# Patient Record
Sex: MALE | Race: WHITE | NOT HISPANIC OR LATINO | URBAN - METROPOLITAN AREA
[De-identification: names, ages, dates, MRNs, and addresses within clinical notes are randomized per-mention and may not be internally consistent; named-entity substitution may affect disease eponyms.]

---

## 2019-08-02 ENCOUNTER — HOSPITAL ENCOUNTER (OUTPATIENT)
Dept: GENERAL RADIOLOGY | Facility: HOSPITAL | Age: 39
Discharge: HOME | End: 2019-08-02
Attending: FAMILY MEDICINE

## 2020-03-20 ENCOUNTER — HOSPITAL ENCOUNTER (OUTPATIENT)
Dept: GENERAL RADIOLOGY | Facility: HOSPITAL | Age: 40
Discharge: HOME | End: 2020-03-20
Attending: FAMILY MEDICINE

## 2021-03-23 ENCOUNTER — HOSPITAL ENCOUNTER (OUTPATIENT)
Dept: LAB | Facility: HOSPITAL | Age: 41
Discharge: HOME | End: 2021-03-23
Attending: PHYSICIAN ASSISTANT

## 2021-03-23 ENCOUNTER — OFFICE VISIT CONVERTED (OUTPATIENT)
Dept: FAMILY MEDICINE CLINIC | Facility: CLINIC | Age: 41
End: 2021-03-23
Attending: PHYSICIAN ASSISTANT

## 2021-03-23 LAB
25(OH)D3 SERPL-MCNC: 39 NG/ML (ref 30–100)
ALBUMIN SERPL-MCNC: 4.9 G/DL (ref 3.5–5)
ALBUMIN/GLOB SERPL: 1.6 {RATIO} (ref 1.4–2.6)
ALP SERPL-CCNC: 88 U/L (ref 53–128)
ALT SERPL-CCNC: 83 U/L (ref 10–40)
ANION GAP SERPL CALC-SCNC: 18 MMOL/L (ref 8–19)
APPEARANCE UR: CLEAR
AST SERPL-CCNC: 47 U/L (ref 15–50)
BASOPHILS # BLD AUTO: 0.03 10*3/UL (ref 0–0.2)
BASOPHILS NFR BLD AUTO: 0.5 % (ref 0–3)
BILIRUB SERPL-MCNC: 0.88 MG/DL (ref 0.2–1.3)
BILIRUB UR QL: NEGATIVE
BUN SERPL-MCNC: 16 MG/DL (ref 5–25)
BUN/CREAT SERPL: 15 {RATIO} (ref 6–20)
CALCIUM SERPL-MCNC: 9.4 MG/DL (ref 8.7–10.4)
CHLORIDE SERPL-SCNC: 102 MMOL/L (ref 99–111)
CHOLEST SERPL-MCNC: 192 MG/DL (ref 107–200)
CHOLEST/HDLC SERPL: 3.3 {RATIO} (ref 3–6)
COLOR UR: YELLOW
CONV ABS IMM GRAN: 0.02 10*3/UL (ref 0–0.2)
CONV CO2: 24 MMOL/L (ref 22–32)
CONV COLLECTION SOURCE (UA): NORMAL
CONV IMMATURE GRAN: 0.4 % (ref 0–1.8)
CONV TOTAL PROTEIN: 7.9 G/DL (ref 6.3–8.2)
CONV UROBILINOGEN IN URINE BY AUTOMATED TEST STRIP: 1 {EHRLICHU}/DL (ref 0.1–1)
CREAT UR-MCNC: 1.07 MG/DL (ref 0.7–1.2)
DEPRECATED RDW RBC AUTO: 38.7 FL (ref 35.1–43.9)
EOSINOPHIL # BLD AUTO: 0.14 10*3/UL (ref 0–0.7)
EOSINOPHIL # BLD AUTO: 2.6 % (ref 0–7)
ERYTHROCYTE [DISTWIDTH] IN BLOOD BY AUTOMATED COUNT: 12.6 % (ref 11.6–14.4)
GFR SERPLBLD BASED ON 1.73 SQ M-ARVRAT: >60 ML/MIN/{1.73_M2}
GLOBULIN UR ELPH-MCNC: 3 G/DL (ref 2–3.5)
GLUCOSE SERPL-MCNC: 88 MG/DL (ref 70–99)
GLUCOSE UR QL: NEGATIVE MG/DL
HCT VFR BLD AUTO: 45.8 % (ref 42–52)
HDLC SERPL-MCNC: 59 MG/DL (ref 40–60)
HGB BLD-MCNC: 15.4 G/DL (ref 14–18)
HGB UR QL STRIP: NEGATIVE
KETONES UR QL STRIP: NEGATIVE MG/DL
LDLC SERPL CALC-MCNC: 125 MG/DL (ref 70–100)
LEUKOCYTE ESTERASE UR QL STRIP: NEGATIVE
LYMPHOCYTES # BLD AUTO: 1.54 10*3/UL (ref 1–5)
LYMPHOCYTES NFR BLD AUTO: 28.1 % (ref 20–45)
MCH RBC QN AUTO: 28.6 PG (ref 27–31)
MCHC RBC AUTO-ENTMCNC: 33.6 G/DL (ref 33–37)
MCV RBC AUTO: 85 FL (ref 80–96)
MONOCYTES # BLD AUTO: 0.56 10*3/UL (ref 0.2–1.2)
MONOCYTES NFR BLD AUTO: 10.2 % (ref 3–10)
NEUTROPHILS # BLD AUTO: 3.19 10*3/UL (ref 2–8)
NEUTROPHILS NFR BLD AUTO: 58.2 % (ref 30–85)
NITRITE UR QL STRIP: NEGATIVE
NRBC CBCN: 0 % (ref 0–0.7)
OSMOLALITY SERPL CALC.SUM OF ELEC: 291 MOSM/KG (ref 273–304)
PH UR STRIP.AUTO: 6 [PH] (ref 5–8)
PLATELET # BLD AUTO: 214 10*3/UL (ref 130–400)
PMV BLD AUTO: 10.5 FL (ref 9.4–12.4)
POTASSIUM SERPL-SCNC: 4.2 MMOL/L (ref 3.5–5.3)
PROT UR QL: NEGATIVE MG/DL
RBC # BLD AUTO: 5.39 10*6/UL (ref 4.7–6.1)
SODIUM SERPL-SCNC: 140 MMOL/L (ref 135–147)
SP GR UR: 1.03 (ref 1–1.03)
T4 FREE SERPL-MCNC: 1.3 NG/DL (ref 0.9–1.8)
TRIGL SERPL-MCNC: 42 MG/DL (ref 40–150)
TSH SERPL-ACNC: 0.56 M[IU]/L (ref 0.27–4.2)
VLDLC SERPL-MCNC: 8 MG/DL (ref 5–37)
WBC # BLD AUTO: 5.48 10*3/UL (ref 4.8–10.8)

## 2021-03-24 LAB
CONV HEPATITIS B SURFACE AG W CONFIRMATION RE: NEGATIVE
CONV HEPATITIS COMMENT: NORMAL
CONV HIV-1/ HIV-2: NONREACTIVE
HBV CORE AB SER DONR QL IA: NEGATIVE
HBV CORE IGM SERPL QL IA: NEGATIVE
HBV E AB SERPL QL IA: NEGATIVE
HBV E AG SERPL QL IA: NEGATIVE
HBV SURFACE AB SER QL: NON REACTIVE
HCV AB S/CO SERPL IA: <0.1 S/CO RATIO (ref 0–0.9)
HSV I/II IGM: <0.91 RATIO (ref 0–0.9)
HSV1 IGG SER IA-ACNC: <0.91 INDEX (ref 0–0.9)
HSV2 IGG SER IA-ACNC: <0.91 INDEX (ref 0–0.9)
RPR SER QL: NORMAL
TESTOST SERPL-MCNC: 474 NG/DL (ref 249–836)

## 2021-03-25 LAB
C TRACH RRNA CVX QL NAA+PROBE: NEGATIVE
N GONORRHOEA DNA SPEC QL NAA+PROBE: NEGATIVE

## 2021-05-14 VITALS
OXYGEN SATURATION: 99 % | DIASTOLIC BLOOD PRESSURE: 90 MMHG | BODY MASS INDEX: 28.55 KG/M2 | WEIGHT: 222.5 LBS | HEIGHT: 74 IN | HEART RATE: 77 BPM | SYSTOLIC BLOOD PRESSURE: 127 MMHG

## 2021-05-14 NOTE — PROGRESS NOTES
"   Progress Note      Patient Name: Blu Lopez   Patient ID: [id number]   Sex: Male   YOB: 1980    Primary Care Provider: Jesse Phelan PA-C   Referring Provider: Jesse Phelan PA-C    Visit Date: March 23, 2021    Provider: Jesse Phelan PA-C   Location: [facility]   Location Address: [address] [place] [address], [address]  [place], [place]  [id number]   Location Phone: [phone]          Chief Complaint  · new patient to establish care  · fatigue      History Of Present Illness  Blu Lopez is a 40 year old male who presents for evaluation and treatment of: new patient to establish care.      pt presents today as new patient to establish care.    pt previous PCP was Gary Phelps.    pt has no previous dx's.    pt states he has been extremely fatigue for the last year. pt states when he sits down or if he is not active he falls asleep. pt states he does not having any sleeping issues.    Labs 2020  flu refused    Pt has a very stressful job and states that he is miserable at work.  He is also needing to checked for STDs, he states that he was recently  and his spouse is wanting him checked to ensure that he has no \"hidden\" STD    Pt does snore and is willing to have a home sleep study performed    He is on no meds  Denies any CP, SOA, HA, N/V, diarrhea, constipation, decreased appetite, wt loss.         Social History  Finding Status Start/Stop Quantity Notes   Alcohol Never --/-- --  --    Tobacco Never --/-- --  --          Review of Systems  · Constitutional  o Admits  o : fatigue  o Denies  o : fever, weight loss, weight gain  · Cardiovascular  o Denies  o : lower extremity edema, claudication, chest pressure, palpitations  · Respiratory  o Denies  o : shortness of breath, wheezing, cough, hemoptysis, dyspnea on exertion  · Gastrointestinal  o Denies  o : nausea, vomiting, diarrhea, constipation, abdominal pain      Vitals  Date Time BP Position Site L\R " "Cuff Size HR RR TEMP (F) WT  HT  BMI kg/m2 BSA m2 O2 Sat FR L/min FiO2 HC       03/23/2021 09:46 /90 Sitting    77 - R   222lbs 8oz 6'  2\" 28.57 2.3 99 %            Physical Examination  · Constitutional  o Appearance  o : well developed, well-nourished, no acute distress  · Head and Face  o Head  o : normocephalic, atraumatic  · Ears, Nose, Mouth and Throat  o Ears  o :   § External Ears  § : external auditory canal appearance normal, no discharge present  § Otoscopic Examination  § : tympanic membranes pearly white/gray bilaterally  o Nose  o :   § External Nose  § : no lesions noted  § Nasopharynx  § : no discharge present  o Oral Cavity  o :   § Oral Mucosa  § : oral mucosa light pink  o Throat  o :   § Oropharynx  § : tonsils without exudate, no palatal petechiae  · Neck  o Inspection/Palpation  o : normal appearance, no masses or tenderness, trachea midline  o Thyroid  o : gland size normal, nontender, no nodules or masses present on palpation  · Respiratory  o Respiratory Effort  o : breathing unlabored  o Inspection of Chest  o : chest rise symmetric bilaterally  o Auscultation of Lungs  o : clear to auscultation bilaterally throughout inspiration and expiration  · Cardiovascular  o Heart  o :   § Auscultation of Heart  § : regular rate and rhythm, no murmurs, gallops or rubs  o Peripheral Vascular System  o :   § Extremities  § : no edema  · Lymphatic  o Neck  o : no cervical lymphadenopathy, no supraclavicular lymphadenopathy  · Psychiatric  o Mood and Affect  o : mood normal, affect appropriate          Assessment  · Fatigue     780.79/R53.83  · STD exposure     V01.6/Z20.2  · Screening for depression     V79.0/Z13.89  · Need for influenza vaccination     V04.81/Z23  · Snoring     786.09/R06.83      Plan  · Orders  o STD Panel (HSV 1 and 2 IgG/IgM, HIV, RPR, GC/Chlamydia) [facility] (35580, 10877, 17169, 75137, 15103, CTNGX, ) - V01.6/Z20.2 - 03/23/2021  o Hepatitis B and C screen (39607) - " V01.6/Z20.2 - 03/23/2021  o ACO-18: Negative screen for clinical depression using a standardized tool () - V79.0/Z13.89 - 03/23/2021  o ACO-14: Influenza immunization was not administered for reasons documented () - V04.81/Z23 - 03/23/2021   refused  o Free T4 (00326) - - 03/23/2021  o Physical, Primary Care Panel (CBC, CMP, Lipid, TSH) [facility] (45466, 69598, 57570, 12645) - - 03/23/2021  o Urinalysis with Reflex Microscopy ([facility]) (89751) - - 03/23/2021  o Vitamin D (25-Hydroxy) Level (84084) - - 03/23/2021  o ACO-39: Current medications updated and reviewed (1159F, ) - - 03/23/2021  o Sleep Study (Basic Sleep Apnea) [facility] (57135) - - 03/23/2021   Home  o Testosterone (Total) (69968) - - 03/24/2021  · Medications  o Medications have been Reconciled  o Transition of Care or Provider Policy  · Instructions  o Depression Screen completed and scanned into the EMR under the designated folder within the patient's documents.  o Today's PHQ-9 result is _4__  o Flu vaccine declined.  o Patient was educated/instructed on their diagnosis, treatment and medications prior to discharge from the clinic today.  o Discussed Covid-19 precautions including, but not limited to, social distancing, avoid touching your face, and hand washing.   · Disposition  o Call or Return if symptoms worsen or persist.  o F/U in 6 months  o Care Transition            Electronically Signed by: Jesse Phelan PA-C -Author on March 24, 2021 11:28:33 AM

## 2023-07-27 ENCOUNTER — OFFICE VISIT (OUTPATIENT)
Dept: FAMILY MEDICINE CLINIC | Facility: CLINIC | Age: 43
End: 2023-07-27
Payer: COMMERCIAL

## 2023-07-27 VITALS
HEART RATE: 90 BPM | SYSTOLIC BLOOD PRESSURE: 122 MMHG | BODY MASS INDEX: 28.11 KG/M2 | WEIGHT: 219 LBS | HEIGHT: 74 IN | OXYGEN SATURATION: 98 % | DIASTOLIC BLOOD PRESSURE: 70 MMHG

## 2023-07-27 DIAGNOSIS — J40 BRONCHITIS: ICD-10-CM

## 2023-07-27 DIAGNOSIS — Z13.29 THYROID DISORDER SCREEN: ICD-10-CM

## 2023-07-27 DIAGNOSIS — Z00.00 ANNUAL PHYSICAL EXAM: Primary | ICD-10-CM

## 2023-07-27 DIAGNOSIS — M79.89 NODULE OF SOFT TISSUE: ICD-10-CM

## 2023-07-27 DIAGNOSIS — Z13.220 LIPID SCREENING: ICD-10-CM

## 2023-07-27 RX ORDER — METHYLPREDNISOLONE 4 MG/1
TABLET ORAL
Qty: 1 EACH | Refills: 0 | Status: SHIPPED | OUTPATIENT
Start: 2023-07-27

## 2023-07-27 RX ORDER — AZITHROMYCIN 250 MG/1
TABLET, FILM COATED ORAL
Qty: 6 TABLET | Refills: 0 | Status: SHIPPED | OUTPATIENT
Start: 2023-07-27

## 2023-07-27 NOTE — PROGRESS NOTES
"Chief Complaint  Establish care, cough, annual exam    SUBJECTIVE  Blu Lopez presents to [facility]     Pt here today complaint of coughing, needs physical, and to to establish care with new provider within the office, previous PCP was Vimal Phelan.     Pt states cough onset 3 weeks, states deep breaths make coug worse. Pt is not a smoker, no hx of asthma.  Denies any shortness of breath    Patient also complaining of a nodule behind left ear, states feels like a pocket of fluid, noticed it while in the shower, it is not painful    History of Present Illness  History reviewed. No pertinent past medical history.   History reviewed. No pertinent family history.   Past Surgical History:   Procedure Laterality Date    GALLBLADDER SURGERY  2000        Current Outpatient Medications:     azithromycin (Zithromax Z-Kyaw) 250 MG tablet, Take 2 tablets by mouth on day 1, then 1 tablet daily on days 2-5, Disp: 6 tablet, Rfl: 0    methylPREDNISolone (MEDROL) 4 MG dose pack, Take as directed on package instructions., Disp: 1 each, Rfl: 0    OBJECTIVE  Vital Signs:   /70   Pulse 90   Ht 188 cm (74\")   Wt 99.3 kg (219 lb)   SpO2 98%   BMI 28.12 kg/m²    Estimated body mass index is 28.12 kg/m² as calculated from the following:    Height as of this encounter: 188 cm (74\").    Weight as of this encounter: 99.3 kg (219 lb).     Wt Readings from Last 3 Encounters:   07/27/23 99.3 kg (219 lb)   03/23/21 101 kg (222 lb 8 oz)     BP Readings from Last 3 Encounters:   07/27/23 122/70   03/23/21 127/90       Physical Exam  Vitals reviewed.   Constitutional:       General: He is not in acute distress.     Appearance: Normal appearance. He is not diaphoretic.   HENT:      Head: Normocephalic and atraumatic. Hair is normal.      Right Ear: Hearing, tympanic membrane, ear canal and external ear normal.      Left Ear: Hearing, tympanic membrane, ear canal and external ear normal.      Ears: "      Comments: Behind left ear there is approximately 2 cm area of swelling, very soft, fluctuant, no overlying erythema or tenderness     Nose: Nose normal. No nasal deformity.      Mouth/Throat:      Mouth: Mucous membranes are moist. No oral lesions.      Pharynx: Uvula midline. No uvula swelling.   Eyes:      General: Lids are normal. No scleral icterus.        Right eye: No discharge.         Left eye: No discharge.      Extraocular Movements: Extraocular movements intact.      Right eye: Normal extraocular motion and no nystagmus.      Left eye: Normal extraocular motion and no nystagmus.      Conjunctiva/sclera: Conjunctivae normal.      Pupils: Pupils are equal, round, and reactive to light.   Neck:      Thyroid: No thyromegaly.      Vascular: No JVD.   Cardiovascular:      Rate and Rhythm: Normal rate and regular rhythm.      Pulses: Normal pulses.      Heart sounds: Normal heart sounds. No murmur heard.    No gallop.   Pulmonary:      Effort: Pulmonary effort is normal. No respiratory distress.      Breath sounds: Rhonchi present. No wheezing or rales.   Chest:      Chest wall: No tenderness.   Abdominal:      General: Bowel sounds are normal. There is no distension.      Palpations: Abdomen is soft. There is no mass.      Tenderness: There is no abdominal tenderness. There is no guarding.      Hernia: No hernia is present.   Musculoskeletal:         General: No tenderness or deformity. Normal range of motion.      Cervical back: Normal range of motion and neck supple.   Lymphadenopathy:      Cervical: No cervical adenopathy.   Skin:     General: Skin is warm and dry.      Findings: No rash.   Neurological:      Mental Status: He is alert and oriented to person, place, and time.      Cranial Nerves: No cranial nerve deficit.      Coordination: Coordination normal.      Deep Tendon Reflexes: Reflexes are normal and symmetric. Reflexes normal.   Psychiatric:         Mood and Affect: Mood normal.          Behavior: Behavior normal.         Thought Content: Thought content normal.         Judgment: Judgment normal.        Result Review              No Images in the past 120 days found..     The above data has been reviewed by MERARY Magdaleno 07/27/2023 07:01 EDT.          Patient Care Team:  Akua Colon APRN as PCP - General (Nurse Practitioner)  Akua Colon APRN as Nurse Practitioner (Family Medicine)    BMI cannot be calculated due to outdated height or weight values.  Please input a current height/weight in Vitals and re-renter BMIFOLLOWUP in Note to pull in correct documentation based on BMI range.       ASSESSMENT & PLAN    Diagnoses and all orders for this visit:    1. Annual physical exam (Primary)  -     Comprehensive Metabolic Panel; Future  -     CBC & Differential; Future  -     Lipid Panel; Future  -     TSH Rfx On Abnormal To Free T4; Future    2. Nodule of soft tissue  -     US Head Neck Soft Tissue; Future    3. Bronchitis  -     azithromycin (Zithromax Z-Kyaw) 250 MG tablet; Take 2 tablets by mouth on day 1, then 1 tablet daily on days 2-5  Dispense: 6 tablet; Refill: 0  -     methylPREDNISolone (MEDROL) 4 MG dose pack; Take as directed on package instructions.  Dispense: 1 each; Refill: 0    4. Lipid screening  -     Lipid Panel; Future    5. Thyroid disorder screen  -     TSH Rfx On Abnormal To Free T4; Future         Tobacco Use: Unknown    Smoking Tobacco Use: Never    Smokeless Tobacco Use: Unknown    Passive Exposure: Not on file       The patient is advised to follow healthy dieat nad exercise, attempt to lose weight, continue current medications, continue current healthy lifestyle patterns, and return for routine annual checkups.      Follow Up     Return if symptoms worsen or fail to improve.        Patient was given instructions and counseling regarding his condition or for health maintenance advice. Please see specific information pulled into the AVS if appropriate.    I have reviewed information obtained and documented by others and I have confirmed the accuracy of this documented note.    Akua Colon, APRN

## 2023-08-15 ENCOUNTER — HOSPITAL ENCOUNTER (OUTPATIENT)
Dept: ULTRASOUND IMAGING | Facility: HOSPITAL | Age: 43
Discharge: HOME | End: 2023-08-15
Admitting: NURSE PRACTITIONER
Payer: COMMERCIAL

## 2023-08-15 DIAGNOSIS — M79.89 NODULE OF SOFT TISSUE: ICD-10-CM

## 2023-08-15 PROCEDURE — 76536 US EXAM OF HEAD AND NECK: CPT

## 2023-10-30 ENCOUNTER — OFFICE VISIT (OUTPATIENT)
Dept: FAMILY MEDICINE CLINIC | Facility: CLINIC | Age: 43
End: 2023-10-30
Payer: COMMERCIAL

## 2023-10-30 VITALS
HEART RATE: 59 BPM | TEMPERATURE: 97.6 F | OXYGEN SATURATION: 99 % | SYSTOLIC BLOOD PRESSURE: 135 MMHG | HEIGHT: 74 IN | BODY MASS INDEX: 28.62 KG/M2 | WEIGHT: 223 LBS | DIASTOLIC BLOOD PRESSURE: 86 MMHG

## 2023-10-30 DIAGNOSIS — R09.81 NASAL CONGESTION: Primary | ICD-10-CM

## 2023-10-30 DIAGNOSIS — J02.9 SORE THROAT: ICD-10-CM

## 2023-10-30 DIAGNOSIS — R05.9 COUGH, UNSPECIFIED TYPE: ICD-10-CM

## 2023-10-30 LAB
EXPIRATION DATE: NORMAL
EXPIRATION DATE: NORMAL
FLUAV AG UPPER RESP QL IA.RAPID: NOT DETECTED
FLUBV AG UPPER RESP QL IA.RAPID: NOT DETECTED
INTERNAL CONTROL: NORMAL
INTERNAL CONTROL: NORMAL
Lab: 7099
Lab: NORMAL
S PYO AG THROAT QL: NEGATIVE
SARS-COV-2 AG UPPER RESP QL IA.RAPID: NOT DETECTED

## 2023-10-30 PROCEDURE — 87880 STREP A ASSAY W/OPTIC: CPT | Performed by: NURSE PRACTITIONER

## 2023-10-30 PROCEDURE — 87428 SARSCOV & INF VIR A&B AG IA: CPT | Performed by: NURSE PRACTITIONER

## 2023-10-30 PROCEDURE — 99213 OFFICE O/P EST LOW 20 MIN: CPT | Performed by: NURSE PRACTITIONER

## 2023-10-30 RX ORDER — BROMPHENIRAMINE MALEATE, PSEUDOEPHEDRINE HYDROCHLORIDE, AND DEXTROMETHORPHAN HYDROBROMIDE 2; 30; 10 MG/5ML; MG/5ML; MG/5ML
10 SYRUP ORAL 4 TIMES DAILY PRN
Qty: 450 ML | Refills: 0 | Status: SHIPPED | OUTPATIENT
Start: 2023-10-30

## 2023-10-30 NOTE — PROGRESS NOTES
"Chief Complaint  Cough, Sinusitis, Sore Throat, and Nasal Congestion PT PRESENTS TODAY WITH COUGH CONGESTION, AND NASAL DRAINAGE AND ST SINCE FRIDAY    SUBJECTIVE  Blu Lopez presents to [facility]     Sinus Problem  This is a new problem. The current episode started in the past 7 days. The problem has been gradually improving since onset. There has been no fever. Associated symptoms include coughing and sneezing. Past treatments include nasal decongestants. The treatment provided mild relief.     History reviewed. No pertinent past medical history.   History reviewed. No pertinent family history.   Past Surgical History:   Procedure Laterality Date    GALLBLADDER SURGERY  2000        Current Outpatient Medications:     brompheniramine-pseudoephedrine-DM 30-2-10 MG/5ML syrup, Take 10 mL by mouth 4 (Four) Times a Day As Needed for Allergies, Congestion or Cough., Disp: 450 mL, Rfl: 0    OBJECTIVE  Vital Signs:   /86   Pulse 59   Temp 97.6 °F (36.4 °C)   Ht 188 cm (74\")   Wt 101 kg (223 lb)   SpO2 99%   BMI 28.63 kg/m²    Estimated body mass index is 28.63 kg/m² as calculated from the following:    Height as of this encounter: 188 cm (74\").    Weight as of this encounter: 101 kg (223 lb).     Wt Readings from Last 3 Encounters:   10/30/23 101 kg (223 lb)   07/27/23 99.3 kg (219 lb)   03/23/21 101 kg (222 lb 8 oz)     BP Readings from Last 3 Encounters:   10/30/23 135/86   07/27/23 122/70   03/23/21 127/90       Physical Exam  Vitals reviewed.   Constitutional:       Appearance: Normal appearance. He is well-developed.   HENT:      Head: Normocephalic and atraumatic.      Right Ear: Tympanic membrane, ear canal and external ear normal.      Left Ear: Tympanic membrane, ear canal and external ear normal.      Nose:      Right Sinus: No maxillary sinus tenderness or frontal sinus tenderness.      Left Sinus: No maxillary sinus tenderness or frontal sinus tenderness. "      Mouth/Throat:      Pharynx: Oropharynx is clear. No oropharyngeal exudate.   Eyes:      Conjunctiva/sclera: Conjunctivae normal.      Pupils: Pupils are equal, round, and reactive to light.   Cardiovascular:      Rate and Rhythm: Normal rate and regular rhythm.      Pulses: Normal pulses.      Heart sounds: Normal heart sounds. No murmur heard.     No friction rub. No gallop.   Pulmonary:      Effort: Pulmonary effort is normal.      Breath sounds: Normal breath sounds. No wheezing or rhonchi.   Skin:     General: Skin is warm and dry.   Neurological:      Mental Status: He is alert and oriented to person, place, and time.      Cranial Nerves: No cranial nerve deficit.   Psychiatric:         Mood and Affect: Mood and affect normal.         Behavior: Behavior normal.         Thought Content: Thought content normal.         Judgment: Judgment normal.          Result Review    SARS Antigen   Date Value Ref Range Status   10/30/2023 Not Detected Not Detected, Presumptive Negative Final     Influenza A Antigen VIPIN   Date Value Ref Range Status   10/30/2023 Not Detected Not Detected Final     Influenza B Antigen VIPIN   Date Value Ref Range Status   10/30/2023 Not Detected Not Detected Final     Internal Control   Date Value Ref Range Status   10/30/2023 Passed Passed Final     Lot Number   Date Value Ref Range Status   10/30/2023 [id number]  Final     Expiration Date   Date Value Ref Range Status   10/30/2023 10/27/2024  Final        POCT rapid strep A  Order: [id number]  Status: Final result       Visible to patient: No (scheduled for 10/30/2023  1:13 PM)       Next appt: None       Dx: Nasal congestion; Sore throat; Cough,...    Specimen Information: Swab   0 Result Notes      Component  Ref Range & Units 12:12   Rapid Strep A Screen  Negative, VALID, INVALID, Not Performed Negative   Internal Control  Passed Passed   Lot Number [id number]   Expiration Date 05/10/2025   [facility]               Specimen Collected: 10/30/23 12:12 EDT Last Resulted: 10/30/23 12:12 EDT        Lab Flowsheet        Order Details        View Encounter        Lab and Collection Details        Routing        Result History     View All Conversations on this Encounter           Result Care Coordination      Patient Communication     10/30/2023  1:13 PM Release Now   Not seen Back to Top               The above data has been reviewed by MERARY Barber 10/30/2023 12:04 EDT.          Patient Care Team:  Akua Colon APRN as PCP - General (Nurse Practitioner)  Akua Colon APRN as Nurse Practitioner (Family Medicine)    BMI is >= 25 and <30. (Overweight) The following options were offered after discussion;: weight loss educational material (shared in after visit summary)       ASSESSMENT & PLAN    Diagnoses and all orders for this visit:    1. Nasal congestion (Primary)  -     POCT rapid strep A  -     POCT SARS-CoV-2 Antigen VIPIN + Flu  -     brompheniramine-pseudoephedrine-DM 30-2-10 MG/5ML syrup; Take 10 mL by mouth 4 (Four) Times a Day As Needed for Allergies, Congestion or Cough.  Dispense: 450 mL; Refill: 0    2. Cough, unspecified type  -     POCT rapid strep A  -     POCT SARS-CoV-2 Antigen VIPIN + Flu  -     brompheniramine-pseudoephedrine-DM 30-2-10 MG/5ML syrup; Take 10 mL by mouth 4 (Four) Times a Day As Needed for Allergies, Congestion or Cough.  Dispense: 450 mL; Refill: 0    3. Sore throat  -     POCT rapid strep A  -     POCT SARS-CoV-2 Antigen VIPIN + Flu  -     brompheniramine-pseudoephedrine-DM 30-2-10 MG/5ML syrup; Take 10 mL by mouth 4 (Four) Times a Day As Needed for Allergies, Congestion or Cough.  Dispense: 450 mL; Refill: 0         Tobacco Use: Unknown (10/30/2023)    Patient History     Smoking Tobacco Use: Never     Smokeless Tobacco Use: Unknown     Passive Exposure: Not on file       Follow Up     Return if symptoms worsen or fail to improve.        Patient was given  instructions and counseling regarding his condition or for health maintenance advice. Please see specific information pulled into the AVS if appropriate.   I have reviewed information obtained and documented by others and I have confirmed the accuracy of this documented note.    MERARY Barber

## 2023-11-06 ENCOUNTER — OFFICE VISIT (OUTPATIENT)
Dept: FAMILY MEDICINE CLINIC | Facility: CLINIC | Age: 43
End: 2023-11-06
Payer: COMMERCIAL

## 2023-11-06 ENCOUNTER — TELEPHONE (OUTPATIENT)
Dept: FAMILY MEDICINE CLINIC | Facility: CLINIC | Age: 43
End: 2023-11-06

## 2023-11-06 VITALS
SYSTOLIC BLOOD PRESSURE: 135 MMHG | BODY MASS INDEX: 28.08 KG/M2 | WEIGHT: 218.8 LBS | DIASTOLIC BLOOD PRESSURE: 88 MMHG | TEMPERATURE: 97.9 F | HEIGHT: 74 IN | HEART RATE: 81 BPM | OXYGEN SATURATION: 99 %

## 2023-11-06 DIAGNOSIS — J01.00 SUBACUTE MAXILLARY SINUSITIS: Primary | ICD-10-CM

## 2023-11-06 PROCEDURE — 99213 OFFICE O/P EST LOW 20 MIN: CPT | Performed by: NURSE PRACTITIONER

## 2023-11-06 RX ORDER — AZITHROMYCIN 250 MG/1
TABLET, FILM COATED ORAL
Qty: 6 TABLET | Refills: 0 | Status: SHIPPED | OUTPATIENT
Start: 2023-11-06

## 2023-11-06 NOTE — TELEPHONE ENCOUNTER
Caller: Blu Lopez    Relationship: Self    Best call back number: [phone]    What medication are you requesting: ANTIBIOTIC     What are your current symptoms: COUGH/CONGESTION, SINUS PRESSURE AND DRAINAGE     How long have you been experiencing symptoms: OVER A WEEK     Have you had these symptoms before:    [x] Yes  [] No    Have you been treated for these symptoms before:   [x] Yes  [] No    If a prescription is needed, what is your preferred pharmacy and phone number: [id number] - [place], [place] - [address] AT [facility] [phone] [facility] [phone]      Additional notes: PATIENT WAS SEEN BY MERARY GRANT ON 10.30 AND WAS PRESCRIBED A COUGH SYRUP. PATIENT STATES SYMPTOMS ARE NOT ANY BETTER AND WANTING TO KNOW IF AN ANTIBIOTIC COULD BE CALLED IN ALSO. PATIENT DID SCHEDULE SAME DAY APPOINTMENT FOR THIS AFTERNOON IN CASE HE NEEDED TO BE SEEN FIRST.

## 2023-11-06 NOTE — PROGRESS NOTES
"Chief Complaint  Cough and Nasal Congestion    SUBJECTIVE  Blu Lopez presents to [facility]    Patient continues to complain of productive cough - yellow/green, runny nose - brown, nasal congestion, post nasal drip.  Patient seen in clinic for same 10/30/23, was given Brom-Phed DM cough syrup.    History of Present Illness  History reviewed. No pertinent past medical history.   History reviewed. No pertinent family history.   Past Surgical History:   Procedure Laterality Date    GALLBLADDER SURGERY  2000        Current Outpatient Medications:     brompheniramine-pseudoephedrine-DM 30-2-10 MG/5ML syrup, Take 10 mL by mouth 4 (Four) Times a Day As Needed for Allergies, Congestion or Cough., Disp: 450 mL, Rfl: 0    azithromycin (Zithromax Z-Kyaw) 250 MG tablet, Take 2 tablets by mouth on day 1, then 1 tablet daily on days 2-5, Disp: 6 tablet, Rfl: 0    OBJECTIVE  Vital Signs:   /88 (BP Location: Left arm, Patient Position: Sitting, Cuff Size: Large Adult)   Pulse 81   Temp 97.9 °F (36.6 °C) (Oral)   Ht 188 cm (74\")   Wt 99.2 kg (218 lb 12.8 oz)   SpO2 99%   BMI 28.09 kg/m²    Estimated body mass index is 28.09 kg/m² as calculated from the following:    Height as of this encounter: 188 cm (74\").    Weight as of this encounter: 99.2 kg (218 lb 12.8 oz).     Wt Readings from Last 3 Encounters:   11/06/23 99.2 kg (218 lb 12.8 oz)   10/30/23 101 kg (223 lb)   07/27/23 99.3 kg (219 lb)     BP Readings from Last 3 Encounters:   11/06/23 135/88   10/30/23 135/86   07/27/23 122/70       Physical Exam  Vitals reviewed.   Constitutional:       Appearance: Normal appearance. He is well-developed.   HENT:      Head: Normocephalic and atraumatic.      Right Ear: Hearing, tympanic membrane, ear canal and external ear normal.      Left Ear: Hearing, tympanic membrane, ear canal and external ear normal.      Nose:      Right Sinus: Maxillary sinus tenderness present.      Left " Sinus: Maxillary sinus tenderness present.      Mouth/Throat:      Pharynx: No oropharyngeal exudate.      Comments: Post nasal drip noted  Eyes:      Conjunctiva/sclera: Conjunctivae normal.      Pupils: Pupils are equal, round, and reactive to light.   Cardiovascular:      Rate and Rhythm: Normal rate and regular rhythm.      Pulses: Normal pulses.      Heart sounds: Normal heart sounds. No murmur heard.     No friction rub. No gallop.   Pulmonary:      Effort: Pulmonary effort is normal.      Breath sounds: Normal breath sounds. No wheezing or rhonchi.   Skin:     General: Skin is warm and dry.   Neurological:      Mental Status: He is alert and oriented to person, place, and time.      Cranial Nerves: No cranial nerve deficit.   Psychiatric:         Mood and Affect: Mood and affect normal.         Behavior: Behavior normal.         Thought Content: Thought content normal.         Judgment: Judgment normal.          Result Review              Patient Care Team:  Akua Colon APRN as PCP - General (Nurse Practitioner)  Akua Colon APRN as Nurse Practitioner (Family Medicine)            ASSESSMENT & PLAN    Diagnoses and all orders for this visit:    1. Subacute maxillary sinusitis (Primary)  -     azithromycin (Zithromax Z-Kyaw) 250 MG tablet; Take 2 tablets by mouth on day 1, then 1 tablet daily on days 2-5  Dispense: 6 tablet; Refill: 0         Tobacco Use: Low Risk  (11/6/2023)    Patient History     Smoking Tobacco Use: Never     Smokeless Tobacco Use: Never     Passive Exposure: Not on file       Follow Up     Return if symptoms worsen or fail to improve.      Patient was given instructions and counseling regarding his condition or for health maintenance advice. Please see specific information pulled into the AVS if appropriate.   I have reviewed information obtained and documented by others and I have confirmed the accuracy of this documented note.    MERARY Barber

## 2024-01-11 ENCOUNTER — OFFICE VISIT (OUTPATIENT)
Dept: FAMILY MEDICINE CLINIC | Facility: CLINIC | Age: 44
End: 2024-01-11
Payer: COMMERCIAL

## 2024-01-11 VITALS
BODY MASS INDEX: 28.18 KG/M2 | WEIGHT: 219.6 LBS | DIASTOLIC BLOOD PRESSURE: 81 MMHG | SYSTOLIC BLOOD PRESSURE: 134 MMHG | HEIGHT: 74 IN | HEART RATE: 74 BPM | OXYGEN SATURATION: 96 % | RESPIRATION RATE: 15 BRPM

## 2024-01-11 DIAGNOSIS — R09.81 CONGESTION OF NASAL SINUS: ICD-10-CM

## 2024-01-11 DIAGNOSIS — J02.9 SORE THROAT: Primary | ICD-10-CM

## 2024-01-11 LAB
EXPIRATION DATE: NORMAL
EXPIRATION DATE: NORMAL
FLUAV AG UPPER RESP QL IA.RAPID: NOT DETECTED
FLUBV AG UPPER RESP QL IA.RAPID: NOT DETECTED
INTERNAL CONTROL: NORMAL
INTERNAL CONTROL: NORMAL
Lab: NORMAL
Lab: NORMAL
S PYO AG THROAT QL: NEGATIVE
SARS-COV-2 AG UPPER RESP QL IA.RAPID: NOT DETECTED

## 2024-01-11 RX ORDER — GUAIFENESIN AND DEXTROMETHORPHAN HYDROBROMIDE 600; 30 MG/1; MG/1
1 TABLET, EXTENDED RELEASE ORAL 2 TIMES DAILY PRN
Qty: 8 TABLET | Refills: 0 | Status: SHIPPED | OUTPATIENT
Start: 2024-01-11

## 2024-01-11 RX ORDER — PSEUDOEPHEDRINE HCL 30 MG
30 TABLET ORAL EVERY 6 HOURS PRN
Qty: 12 TABLET | Refills: 0 | Status: SHIPPED | OUTPATIENT
Start: 2024-01-11

## 2024-01-11 RX ORDER — BENZONATATE 100 MG/1
100 CAPSULE ORAL 3 TIMES DAILY PRN
Qty: 12 CAPSULE | Refills: 0 | Status: SHIPPED | OUTPATIENT
Start: 2024-01-11

## 2024-01-11 NOTE — PROGRESS NOTES
Subjective:       Blu Lopez is a 43 y.o. male who presents for cough and congestion.    Mr. Lopez is normally seen by my colleague, PCP Akua Colon. I am seeing him today for an acute sick visit.    Mr. Lopez reports intermittently cough (occasionally productive), congestion, and sore throat.  Symptoms started approximately 2 days ago.  See review of systems for other pertinent details.  No fever, chills, or signs of systemic illness.    He has been using Tylenol and DayQuil without relief.    Vital signs are stable and physical exam is reassuring.  Will treat him symptomatically with Sudafed for congestion, Mucinex DM for productive cough, and Tessalon Perles for cough as well.    He can follow-up as needed or if symptoms fail to improve.    He does mention some chronic fatigue and I will get him scheduled for 1 month follow-up with PCP to discuss this further.    The following portions of the patient's history were reviewed and updated as appropriate: allergies, current medications, past family history, past medical history, past social history, past surgical history, and problem list.    Past Medical Hx:  History reviewed. No pertinent past medical history.    Past Surgical Hx:  Past Surgical History:   Procedure Laterality Date    GALLBLADDER SURGERY  2000       Current Meds:    Current Outpatient Medications:     azithromycin (Zithromax Z-Kyaw) 250 MG tablet, Take 2 tablets by mouth on day 1, then 1 tablet daily on days 2-5, Disp: 6 tablet, Rfl: 0    benzonatate (Tessalon Perles) 100 MG capsule, Take 1 capsule by mouth 3 (Three) Times a Day As Needed for Cough., Disp: 12 capsule, Rfl: 0    guaifenesin-dextromethorphan (MUCINEX DM)  MG tablet sustained-release 12 hour tablet, Take 1 tablet by mouth 2 (Two) Times a Day As Needed (productive cough)., Disp: 8 tablet, Rfl: 0    phenol (CHLORASEPTIC) 1.4 % liquid liquid, Apply 1 spray to the mouth or throat Every 2 (Two) Hours As Needed  "(sore throat)., Disp: 118 mL, Rfl: 0    pseudoephedrine (Sudafed) 30 MG tablet, Take 1 tablet by mouth Every 6 (Six) Hours As Needed for Congestion., Disp: 12 tablet, Rfl: 0    Allergies:  Allergies   Allergen Reactions    Penicillins Swelling and Rash       Family Hx:  History reviewed. No pertinent family history.     Social History:  Social History     Socioeconomic History    Marital status:    Tobacco Use    Smoking status: Never     Passive exposure: Never    Smokeless tobacco: Never   Vaping Use    Vaping Use: Never used   Substance and Sexual Activity    Alcohol use: Yes     Comment: twice a week    Drug use: Never    Sexual activity: Defer       Review of Systems  Review of Systems   Constitutional:  Positive for fatigue. Negative for activity change, appetite change, chills and fever.   HENT:  Positive for congestion, postnasal drip, rhinorrhea, sneezing and sore throat.    Respiratory:  Positive for cough (some). Negative for shortness of breath and wheezing.    Gastrointestinal:  Negative for nausea and vomiting.   Neurological:  Positive for headaches (at baseline). Negative for weakness.       Objective:     /81 (BP Location: Left arm, Patient Position: Sitting, Cuff Size: Adult)   Pulse 74   Resp 15   Ht 188 cm (74.02\")   Wt 99.6 kg (219 lb 9.6 oz)   SpO2 96%   BMI 28.18 kg/m²   Physical Exam  Constitutional:       General: He is not in acute distress.     Appearance: Normal appearance. He is obese. He is not ill-appearing, toxic-appearing or diaphoretic.   Eyes:      Extraocular Movements: Extraocular movements intact.   Cardiovascular:      Rate and Rhythm: Normal rate and regular rhythm.   Pulmonary:      Effort: Pulmonary effort is normal. No respiratory distress.      Breath sounds: Normal breath sounds.   Skin:     General: Skin is warm and dry.      Coloration: Skin is not jaundiced.   Neurological:      Mental Status: He is alert.   Psychiatric:         Mood and Affect: Mood " normal.         Behavior: Behavior normal.          Assessment/Plan:     Diagnoses and all orders for this visit:    1. Sore throat (Primary)  2. Congestion of nasal sinus    Mr. Lopez is normally seen by my colleague, PCP Akua Colon. I am seeing him today for an acute sick visit.    Mr. Lopez reports intermittently cough (occasionally productive), congestion, and sore throat.  Symptoms started approximately 2 days ago.  See review of systems for other pertinent details.  No fever, chills, or signs of systemic illness.    He has been using Tylenol and DayQuil without relief.    Point-of-care COVID and flu and strep testing were negative    Vital signs are stable and physical exam is reassuring.  Will treat him symptomatically with Sudafed for congestion, Mucinex DM for productive cough, and Tessalon Perles for cough as well.  Would use phenol Chloraseptic spray for sore throat.    He can follow-up as needed or if symptoms fail to improve.      -     POCT rapid strep A  -     phenol (CHLORASEPTIC) 1.4 % liquid liquid; Apply 1 spray to the mouth or throat Every 2 (Two) Hours As Needed (sore throat).  Dispense: 118 mL; Refill: 0  -     guaifenesin-dextromethorphan (MUCINEX DM)  MG tablet sustained-release 12 hour tablet; Take 1 tablet by mouth 2 (Two) Times a Day As Needed (productive cough).  Dispense: 8 tablet; Refill: 0  -     benzonatate (Tessalon Perles) 100 MG capsule; Take 1 capsule by mouth 3 (Three) Times a Day As Needed for Cough.  Dispense: 12 capsule; Refill: 0  -     POCT SARS-CoV-2 Antigen VIPIN + Flu  -     pseudoephedrine (Sudafed) 30 MG tablet; Take 1 tablet by mouth Every 6 (Six) Hours As Needed for Congestion.  Dispense: 12 tablet; Refill: 0          Rx changes: Short course of Sudafed, Mucinex DM, Tessalon Perles, and    Follow-up:     Return if symptoms worsen or fail to improve.    Preventative:  Health Maintenance   Topic Date Due    TDAP/TD VACCINES (1 - Tdap) Never done     INFLUENZA VACCINE  03/31/2024 (Originally 8/1/2023)    COVID-19 Vaccine (1) 07/29/2024 (Originally 3/17/1981)    ANNUAL PHYSICAL  07/27/2024    BMI FOLLOWUP  10/30/2024    HEPATITIS C SCREENING  Completed    Pneumococcal Vaccine 0-64  Aged Out         This document has been electronically signed by Jeremiah Liriano MD on January 11, 2024 15:27 EST       Parts of this note are electronic transcriptions/translations of spoken language to printed text using the Dragon Dictation system.

## 2024-02-12 ENCOUNTER — LAB (OUTPATIENT)
Dept: LAB | Facility: HOSPITAL | Age: 44
End: 2024-02-12
Payer: COMMERCIAL

## 2024-02-12 ENCOUNTER — OFFICE VISIT (OUTPATIENT)
Dept: FAMILY MEDICINE CLINIC | Facility: CLINIC | Age: 44
End: 2024-02-12
Payer: COMMERCIAL

## 2024-02-12 VITALS
OXYGEN SATURATION: 97 % | SYSTOLIC BLOOD PRESSURE: 126 MMHG | HEIGHT: 74 IN | WEIGHT: 226 LBS | DIASTOLIC BLOOD PRESSURE: 84 MMHG | BODY MASS INDEX: 29 KG/M2 | HEART RATE: 72 BPM

## 2024-02-12 DIAGNOSIS — R53.83 FATIGUE, UNSPECIFIED TYPE: Primary | ICD-10-CM

## 2024-02-12 DIAGNOSIS — Z13.220 LIPID SCREENING: ICD-10-CM

## 2024-02-12 DIAGNOSIS — Z00.00 ANNUAL PHYSICAL EXAM: ICD-10-CM

## 2024-02-12 DIAGNOSIS — R53.83 FATIGUE, UNSPECIFIED TYPE: ICD-10-CM

## 2024-02-12 DIAGNOSIS — Z12.11 COLON CANCER SCREENING: ICD-10-CM

## 2024-02-12 DIAGNOSIS — R73.09 ELEVATED GLUCOSE: ICD-10-CM

## 2024-02-12 DIAGNOSIS — Z13.29 THYROID DISORDER SCREEN: ICD-10-CM

## 2024-02-12 LAB
ALBUMIN SERPL-MCNC: 4.3 G/DL (ref 3.5–5.2)
ALBUMIN/GLOB SERPL: 1.4 G/DL
ALP SERPL-CCNC: 83 U/L (ref 39–117)
ALT SERPL W P-5'-P-CCNC: 31 U/L (ref 1–41)
ANION GAP SERPL CALCULATED.3IONS-SCNC: 12.3 MMOL/L (ref 5–15)
AST SERPL-CCNC: 23 U/L (ref 1–40)
BASOPHILS # BLD AUTO: 0.04 10*3/MM3 (ref 0–0.2)
BASOPHILS NFR BLD AUTO: 0.7 % (ref 0–1.5)
BILIRUB SERPL-MCNC: 0.3 MG/DL (ref 0–1.2)
BUN SERPL-MCNC: 18 MG/DL (ref 6–20)
BUN/CREAT SERPL: 16.2 (ref 7–25)
CALCIUM SPEC-SCNC: 9.4 MG/DL (ref 8.6–10.5)
CHLORIDE SERPL-SCNC: 103 MMOL/L (ref 98–107)
CHOLEST SERPL-MCNC: 177 MG/DL (ref 0–200)
CO2 SERPL-SCNC: 24.7 MMOL/L (ref 22–29)
CREAT SERPL-MCNC: 1.11 MG/DL (ref 0.76–1.27)
DEPRECATED RDW RBC AUTO: 38.5 FL (ref 37–54)
EGFRCR SERPLBLD CKD-EPI 2021: 84 ML/MIN/1.73
EOSINOPHIL # BLD AUTO: 0.36 10*3/MM3 (ref 0–0.4)
EOSINOPHIL NFR BLD AUTO: 6.4 % (ref 0.3–6.2)
ERYTHROCYTE [DISTWIDTH] IN BLOOD BY AUTOMATED COUNT: 12.8 % (ref 12.3–15.4)
FOLATE SERPL-MCNC: 13.7 NG/ML (ref 4.78–24.2)
GLOBULIN UR ELPH-MCNC: 3 GM/DL
GLUCOSE SERPL-MCNC: 117 MG/DL (ref 65–99)
HCT VFR BLD AUTO: 40.9 % (ref 37.5–51)
HDLC SERPL-MCNC: 47 MG/DL (ref 40–60)
HGB BLD-MCNC: 14.1 G/DL (ref 13–17.7)
IMM GRANULOCYTES # BLD AUTO: 0.04 10*3/MM3 (ref 0–0.05)
IMM GRANULOCYTES NFR BLD AUTO: 0.7 % (ref 0–0.5)
LDLC SERPL CALC-MCNC: 110 MG/DL (ref 0–100)
LDLC/HDLC SERPL: 2.29 {RATIO}
LYMPHOCYTES # BLD AUTO: 1.77 10*3/MM3 (ref 0.7–3.1)
LYMPHOCYTES NFR BLD AUTO: 31.4 % (ref 19.6–45.3)
MCH RBC QN AUTO: 28.9 PG (ref 26.6–33)
MCHC RBC AUTO-ENTMCNC: 34.5 G/DL (ref 31.5–35.7)
MCV RBC AUTO: 83.8 FL (ref 79–97)
MONOCYTES # BLD AUTO: 0.66 10*3/MM3 (ref 0.1–0.9)
MONOCYTES NFR BLD AUTO: 11.7 % (ref 5–12)
NEUTROPHILS NFR BLD AUTO: 2.77 10*3/MM3 (ref 1.7–7)
NEUTROPHILS NFR BLD AUTO: 49.1 % (ref 42.7–76)
NRBC BLD AUTO-RTO: 0 /100 WBC (ref 0–0.2)
PLATELET # BLD AUTO: 204 10*3/MM3 (ref 140–450)
PMV BLD AUTO: 10.4 FL (ref 6–12)
POTASSIUM SERPL-SCNC: 3.8 MMOL/L (ref 3.5–5.2)
PROT SERPL-MCNC: 7.3 G/DL (ref 6–8.5)
RBC # BLD AUTO: 4.88 10*6/MM3 (ref 4.14–5.8)
SODIUM SERPL-SCNC: 140 MMOL/L (ref 136–145)
T4 FREE SERPL-MCNC: 0.97 NG/DL (ref 0.93–1.7)
TESTOST SERPL-MCNC: 386 NG/DL (ref 249–836)
TRIGL SERPL-MCNC: 113 MG/DL (ref 0–150)
TSH SERPL DL<=0.05 MIU/L-ACNC: 2.24 UIU/ML (ref 0.27–4.2)
VIT B12 BLD-MCNC: 337 PG/ML (ref 211–946)
VLDLC SERPL-MCNC: 20 MG/DL (ref 5–40)
WBC NRBC COR # BLD AUTO: 5.64 10*3/MM3 (ref 3.4–10.8)

## 2024-02-12 PROCEDURE — 83036 HEMOGLOBIN GLYCOSYLATED A1C: CPT

## 2024-02-12 PROCEDURE — 80053 COMPREHEN METABOLIC PANEL: CPT

## 2024-02-12 PROCEDURE — 84439 ASSAY OF FREE THYROXINE: CPT

## 2024-02-12 PROCEDURE — 85025 COMPLETE CBC W/AUTO DIFF WBC: CPT

## 2024-02-12 PROCEDURE — 99214 OFFICE O/P EST MOD 30 MIN: CPT | Performed by: NURSE PRACTITIONER

## 2024-02-12 PROCEDURE — 36415 COLL VENOUS BLD VENIPUNCTURE: CPT

## 2024-02-12 PROCEDURE — 82746 ASSAY OF FOLIC ACID SERUM: CPT

## 2024-02-12 PROCEDURE — 84403 ASSAY OF TOTAL TESTOSTERONE: CPT

## 2024-02-12 PROCEDURE — 82607 VITAMIN B-12: CPT

## 2024-02-12 PROCEDURE — 80061 LIPID PANEL: CPT

## 2024-02-12 PROCEDURE — 84443 ASSAY THYROID STIM HORMONE: CPT

## 2024-02-13 DIAGNOSIS — R73.09 ELEVATED GLUCOSE: Primary | ICD-10-CM

## 2024-02-13 LAB — HBA1C MFR BLD: 5.6 % (ref 4.8–5.6)

## 2024-03-22 ENCOUNTER — OFFICE VISIT (OUTPATIENT)
Dept: FAMILY MEDICINE CLINIC | Facility: CLINIC | Age: 44
End: 2024-03-22
Payer: COMMERCIAL

## 2024-03-22 VITALS
BODY MASS INDEX: 29.65 KG/M2 | HEART RATE: 94 BPM | HEIGHT: 74 IN | DIASTOLIC BLOOD PRESSURE: 74 MMHG | OXYGEN SATURATION: 98 % | SYSTOLIC BLOOD PRESSURE: 119 MMHG | WEIGHT: 231 LBS

## 2024-03-22 DIAGNOSIS — J45.21 MILD INTERMITTENT ASTHMA WITH EXACERBATION: ICD-10-CM

## 2024-03-22 DIAGNOSIS — J34.89 DRAINAGE FROM NOSE: Primary | ICD-10-CM

## 2024-03-22 DIAGNOSIS — J02.9 SORE THROAT: ICD-10-CM

## 2024-03-22 DIAGNOSIS — J30.1 SEASONAL ALLERGIC RHINITIS DUE TO POLLEN: ICD-10-CM

## 2024-03-22 LAB
EXPIRATION DATE: NORMAL
INTERNAL CONTROL: NORMAL
Lab: 8725
S PYO AG THROAT QL: NEGATIVE

## 2024-03-22 PROCEDURE — 87880 STREP A ASSAY W/OPTIC: CPT | Performed by: STUDENT IN AN ORGANIZED HEALTH CARE EDUCATION/TRAINING PROGRAM

## 2024-03-22 PROCEDURE — 99213 OFFICE O/P EST LOW 20 MIN: CPT | Performed by: STUDENT IN AN ORGANIZED HEALTH CARE EDUCATION/TRAINING PROGRAM

## 2024-03-22 RX ORDER — PSEUDOEPHEDRINE HCL 30 MG
30 TABLET ORAL EVERY 6 HOURS PRN
Qty: 12 TABLET | Refills: 0 | Status: SHIPPED | OUTPATIENT
Start: 2024-03-22

## 2024-03-22 RX ORDER — PREDNISONE 20 MG/1
20 TABLET ORAL DAILY
Qty: 5 TABLET | Refills: 0 | Status: SHIPPED | OUTPATIENT
Start: 2024-03-22 | End: 2024-03-27

## 2024-03-22 RX ORDER — FLUTICASONE PROPIONATE 50 MCG
2 SPRAY, SUSPENSION (ML) NASAL DAILY
Qty: 9.9 ML | Refills: 0 | Status: SHIPPED | OUTPATIENT
Start: 2024-03-22

## 2024-03-22 RX ORDER — LORATADINE 10 MG/1
10 TABLET ORAL DAILY
Qty: 30 TABLET | Refills: 0 | Status: SHIPPED | OUTPATIENT
Start: 2024-03-22

## 2024-03-22 RX ORDER — ALBUTEROL SULFATE 90 UG/1
2 AEROSOL, METERED RESPIRATORY (INHALATION) EVERY 4 HOURS PRN
Qty: 18 G | Refills: 0 | Status: SHIPPED | OUTPATIENT
Start: 2024-03-22

## 2024-03-22 RX ORDER — GUAIFENESIN AND DEXTROMETHORPHAN HYDROBROMIDE 600; 30 MG/1; MG/1
1 TABLET, EXTENDED RELEASE ORAL 2 TIMES DAILY PRN
Qty: 8 TABLET | Refills: 0 | Status: SHIPPED | OUTPATIENT
Start: 2024-03-22

## 2024-03-22 NOTE — PROGRESS NOTES
Subjective:       Blu Lopez is a 43 y.o. male who presents for cough and congestion    Obed is normally seen by PCP, my colleague Akua Colon.  I am seeing him today for an acute sick visit.    I saw Obed months ago for similar symptoms and at that time treated him symptomatically.  Today, he again reports congestion, postnasal drip, rhinorrhea, sinus pressure, sneezing, and sore throat as well as intermittent cough and wheezing.  The symptoms began approximately 2 to 3 days ago.  Cough sometimes awakens him from sleep.  He has never had pulmonary function testing and is not a smoker and has not been diagnosed with asthma in the past although he says he has been given an albuterol inhaler in the past.    On physical exam, he does have some wheezing.  Vital signs are reassuring blood pressure is normotensive and he is satting 98% on room air.    Although Obed has never been diagnosed with asthma before, I do have some suspicion for an asthma exacerbation.  I would prescribe him an albuterol inhaler and a short course of steroids to assess for improvement.  I will get him set up for pulmonary function testing.    I suspect that Obed's asthma-like symptoms were triggered by seasonal allergies and he does say that we discussed this the last time I saw him.  To help control these symptoms over the long-term I will start him on Flonase and Claritin.  In the interval, I will again treat him symptomatically with Sudafed, Mucinex DM to help alleviate his congestion and cough at present.    He can follow-up as needed if symptoms fail to improve.      The following portions of the patient's history were reviewed and updated as appropriate: allergies, current medications, past family history, past medical history, past social history, past surgical history, and problem list.    Past Medical Hx:  History reviewed. No pertinent past medical history.    Past Surgical Hx:  Past Surgical History:  "  Procedure Laterality Date    GALLBLADDER SURGERY  2000       Current Meds:    Current Outpatient Medications:     albuterol sulfate  (90 Base) MCG/ACT inhaler, Inhale 2 puffs Every 4 (Four) Hours As Needed for Wheezing., Disp: 18 g, Rfl: 0    fluticasone (FLONASE) 50 MCG/ACT nasal spray, 2 sprays into the nostril(s) as directed by provider Daily., Disp: 9.9 mL, Rfl: 0    guaifenesin-dextromethorphan 600-30 mg (MUCINEX DM)  MG tablet sustained-release 12 hour, Take 1 tablet by mouth 2 (Two) Times a Day As Needed (productive cough)., Disp: 8 tablet, Rfl: 0    loratadine (Claritin) 10 MG tablet, Take 1 tablet by mouth Daily., Disp: 30 tablet, Rfl: 0    predniSONE (DELTASONE) 20 MG tablet, Take 1 tablet by mouth Daily for 5 days., Disp: 5 tablet, Rfl: 0    pseudoephedrine (Sudafed) 30 MG tablet, Take 1 tablet by mouth Every 6 (Six) Hours As Needed for Congestion., Disp: 12 tablet, Rfl: 0    Allergies:  Allergies   Allergen Reactions    Penicillins Swelling and Rash       Family Hx:  History reviewed. No pertinent family history.     Social History:  Social History     Socioeconomic History    Marital status:    Tobacco Use    Smoking status: Never     Passive exposure: Never    Smokeless tobacco: Never   Vaping Use    Vaping status: Never Used   Substance and Sexual Activity    Alcohol use: Yes     Comment: twice a week    Drug use: Never    Sexual activity: Defer       Review of Systems  Review of Systems   Constitutional:  Positive for chills and fatigue. Negative for fever.   HENT:  Positive for congestion, postnasal drip, rhinorrhea, sinus pressure, sneezing and sore throat. Negative for ear pain.    Respiratory:  Positive for cough and wheezing. Negative for shortness of breath.    Allergic/Immunologic: Positive for environmental allergies.       Objective:     /74   Pulse 94   Ht 188 cm (74\")   Wt 105 kg (231 lb)   SpO2 98%   BMI 29.66 kg/m²   Physical Exam  Constitutional:       " General: He is not in acute distress.     Appearance: Normal appearance. He is obese. He is not ill-appearing, toxic-appearing or diaphoretic.   HENT:      Nose: Congestion and rhinorrhea present.      Mouth/Throat:      Mouth: Mucous membranes are moist.      Pharynx: Oropharynx is clear. No oropharyngeal exudate.   Pulmonary:      Effort: Pulmonary effort is normal. No respiratory distress.      Breath sounds: No stridor. Wheezing present. No rhonchi or rales.   Neurological:      Mental Status: He is alert.   Psychiatric:         Mood and Affect: Mood normal.         Behavior: Behavior normal.          Assessment/Plan:     Diagnoses and all orders for this visit:    1. Drainage from nose (Primary)  2. Sore throat  3. Mild intermittent asthma with exacerbation  4. Seasonal allergic rhinitis due to pollen    I saw Obed months ago for similar symptoms and at that time treated him symptomatically.  Today, he again reports congestion, postnasal drip, rhinorrhea, sinus pressure, sneezing, and sore throat as well as intermittent cough and wheezing.  The symptoms began approximately 2 to 3 days ago.  Cough sometimes awakens him from sleep.  He has never had pulmonary function testing and is not a smoker and has not been diagnosed with asthma in the past although he says he has been given an albuterol inhaler in the past.    On physical exam, he does have some wheezing.  Vital signs are reassuring blood pressure is normotensive and he is satting 98% on room air.    Although Obed has never been diagnosed with asthma before, I do have some suspicion for an asthma exacerbation.  I would prescribe him an albuterol inhaler and a short course of steroids to assess for improvement.  I will get him set up for pulmonary function testing.    I suspect that Obed's asthma-like symptoms were triggered by seasonal allergies and he does say that we discussed this the last time I saw him.  To help control these symptoms over  the long-term I will start him on Flonase and Claritin.  In the interval, I will again treat him symptomatically with Sudafed, Mucinex DM to help alleviate his congestion and cough at present.    He can follow-up as needed if symptoms fail to improve.    -     POCT rapid strep A  -     guaifenesin-dextromethorphan 600-30 mg (MUCINEX DM)  MG tablet sustained-release 12 hour; Take 1 tablet by mouth 2 (Two) Times a Day As Needed (productive cough).  Dispense: 8 tablet; Refill: 0  -     pseudoephedrine (Sudafed) 30 MG tablet; Take 1 tablet by mouth Every 6 (Six) Hours As Needed for Congestion.  Dispense: 12 tablet; Refill: 0      -     POCT rapid strep A    -     predniSONE (DELTASONE) 20 MG tablet; Take 1 tablet by mouth Daily for 5 days.  Dispense: 5 tablet; Refill: 0  -     albuterol sulfate  (90 Base) MCG/ACT inhaler; Inhale 2 puffs Every 4 (Four) Hours As Needed for Wheezing.  Dispense: 18 g; Refill: 0  -     Complete PFT - Pre & Post Bronchodilator; Future      -     fluticasone (FLONASE) 50 MCG/ACT nasal spray; 2 sprays into the nostril(s) as directed by provider Daily.  Dispense: 9.9 mL; Refill: 0  -     loratadine (Claritin) 10 MG tablet; Take 1 tablet by mouth Daily.  Dispense: 30 tablet; Refill: 0          Rx changes: See body of note for details    Follow-up:     Return if symptoms worsen or fail to improve, for Next scheduled follow up.    Preventative:  Health Maintenance   Topic Date Due    COVID-19 Vaccine (1 - 2023-24 season) Never done    TDAP/TD VACCINES (1 - Tdap) 03/23/2024 (Originally 9/17/1998)    INFLUENZA VACCINE  03/31/2024 (Originally 8/1/2023)    ANNUAL PHYSICAL  07/27/2024    BMI FOLLOWUP  10/30/2024    HEPATITIS C SCREENING  Completed    Pneumococcal Vaccine 0-64  Aged Out         This document has been electronically signed by Jeremiah Liriano MD on March 22, 2024 13:01 EDT       Parts of this note are electronic transcriptions/translations of spoken language to printed text  using the Dragon Dictation system.

## 2024-04-01 DIAGNOSIS — R06.09 DYSPNEA ON EXERTION: Primary | ICD-10-CM

## 2024-04-07 DIAGNOSIS — J45.21 MILD INTERMITTENT ASTHMA WITH EXACERBATION: ICD-10-CM

## 2024-04-08 RX ORDER — ALBUTEROL SULFATE 90 UG/1
2 AEROSOL, METERED RESPIRATORY (INHALATION) EVERY 4 HOURS PRN
Qty: 8.5 G | Refills: 0 | Status: SHIPPED | OUTPATIENT
Start: 2024-04-08

## 2024-04-21 DIAGNOSIS — J30.1 SEASONAL ALLERGIC RHINITIS DUE TO POLLEN: ICD-10-CM

## 2024-04-22 RX ORDER — FLUTICASONE PROPIONATE 50 MCG
SPRAY, SUSPENSION (ML) NASAL
Qty: 16 G | Refills: 0 | Status: SHIPPED | OUTPATIENT
Start: 2024-04-22

## 2024-05-14 ENCOUNTER — HOSPITAL ENCOUNTER (OUTPATIENT)
Dept: RESPIRATORY THERAPY | Facility: HOSPITAL | Age: 44
Discharge: HOME | End: 2024-05-14
Admitting: STUDENT IN AN ORGANIZED HEALTH CARE EDUCATION/TRAINING PROGRAM
Payer: COMMERCIAL

## 2024-05-14 DIAGNOSIS — R06.09 DYSPNEA ON EXERTION: ICD-10-CM

## 2024-05-14 PROCEDURE — 94060 EVALUATION OF WHEEZING: CPT

## 2024-05-14 PROCEDURE — 94729 DIFFUSING CAPACITY: CPT

## 2024-05-14 PROCEDURE — 94726 PLETHYSMOGRAPHY LUNG VOLUMES: CPT

## 2024-05-14 RX ORDER — ALBUTEROL SULFATE 2.5 MG/3ML
2.5 SOLUTION RESPIRATORY (INHALATION) ONCE
Status: COMPLETED | OUTPATIENT
Start: 2024-05-14 | End: 2024-05-14

## 2024-05-14 RX ADMIN — ALBUTEROL SULFATE 2.5 MG: 2.5 SOLUTION RESPIRATORY (INHALATION) at 12:23

## 2024-05-16 NOTE — PROGRESS NOTES
I have reviewed Mr. Lopez's pulmonary function testing.  There was no evidence of obstruction or restriction.  Based on that, he probably no longer needs the albuterol I had prescribed since asthma was not identified.  He can follow-up with PCP as previously scheduled.  Can we notify him?    Thank you,    Jeremiah Liriano    ?  This document has been electronically signed by Jreemiah Liriano MD on May 16, 2024 15:54 EDT

## 2024-06-14 ENCOUNTER — TRANSCRIBE ORDERS (OUTPATIENT)
Dept: PHYSICAL THERAPY | Facility: CLINIC | Age: 44
End: 2024-06-14
Payer: COMMERCIAL

## 2024-06-14 DIAGNOSIS — S39.012A ACUTE MYOFASCIAL STRAIN OF LUMBAR REGION, INITIAL ENCOUNTER: Primary | ICD-10-CM

## 2024-06-17 DIAGNOSIS — J45.21 MILD INTERMITTENT ASTHMA WITH EXACERBATION: ICD-10-CM

## 2024-06-17 RX ORDER — ALBUTEROL SULFATE 90 UG/1
2 AEROSOL, METERED RESPIRATORY (INHALATION) EVERY 4 HOURS PRN
Qty: 8.5 G | Refills: 0 | Status: SHIPPED | OUTPATIENT
Start: 2024-06-17

## 2024-06-20 ENCOUNTER — TREATMENT (OUTPATIENT)
Dept: PHYSICAL THERAPY | Facility: CLINIC | Age: 44
End: 2024-06-20
Payer: OTHER MISCELLANEOUS

## 2024-06-20 DIAGNOSIS — M54.50 ACUTE BILATERAL LOW BACK PAIN WITHOUT SCIATICA: Primary | ICD-10-CM

## 2024-06-20 NOTE — PROGRESS NOTES
Physical Therapy Initial Evaluation and Plan of Care                       Patient: Blu Lopez   : 1980  Diagnosis/ICD-10 Code:  Acute bilateral low back pain without sciatica [M54.50]  Referring practitioner: Anup Zhang MD  Date of Initial Visit: 2024  Today's Date: 2024  Patient seen for 1 sessions           Subjective Questionnaire: Oswestry: 22      Subjective Evaluation    History of Present Illness  Date of onset: 2024  Mechanism of injury: Patient reports lipping and catching himself on a robot arm at work and noted back tightness after. The next day he noted worsening pain and going to urgent care the next day and receiving a steroid and muscle relaxer that helped with most of the pain. He reports a persisting deep ache above his right hip bone in his lower back. This requires him shift frequently to find a comfortable position, but denies sleep disturbance. He reports increased pain with bending and lifting, especially if the weight is far away from his body.     Pain  Current pain rating: 3  At worst pain ratin  Progression: improved    Treatments  Previous treatment: medication  Patient Goals  Patient goals for therapy: increased strength, independence with ADLs/IADLs, return to sport/leisure activities, return to work, decreased pain and increased motion             Objective          Palpation     Right Tenderness of the erector spinae.     Additional Palpation Details  Hypomobile lumbar spine PA with reproduction of familiar pain at right L3 UPA    Active Range of Motion     Lumbar   Flexion: 65 (%) degrees   Extension: 55 (%) degrees   Left lateral flexion: 80 (%) degrees   Right lateral flexion: 70 (%) degrees   Left rotation: 100 (%) degrees   Right rotation: 90 (%) degrees     Strength/Myotome Testing     Left Hip   Planes of Motion   Flexion: 5  Abduction: 5  Adduction: 5  External rotation: 5  Internal rotation: 5    Right Hip   Planes of Motion    Flexion: 5  Abduction: 5  Adduction: 5  External rotation: 5  Internal rotation: 5    Left Knee   Flexion: 5  Extension: 5    Right Knee   Flexion: 5  Extension: 5    Lumbar Flexibility Comments:   HS-restricted      See Exercise, Manual, and Modality Logs for complete treatment.     Assessment & Plan       Assessment  Impairments: abnormal gait, abnormal muscle firing, abnormal muscle tone, abnormal or restricted ROM, activity intolerance, impaired balance, impaired physical strength, lacks appropriate home exercise program, pain with function and safety issue   Functional limitations: carrying objects, lifting, sleeping, walking, pulling, pushing, uncomfortable because of pain, moving in bed, sitting, standing, stooping and unable to perform repetitive tasks   Assessment details: The patient presents to physical therapy with complaints of low back pain. Signs and symptoms are consistent with acute lumbar paraspinal strain and L3 facet dysfunction. He would benefit from skilled physical therapy as described below to address these limitations and allow the patient to return to his prior level of function.   Prognosis: good    Goals  Plan Goals: LOW BACK PROBLEMS:    1. The patient complains of low back pain.  LTG 1: 8 weeks:  The patient will report a pain rating of 0 or better in order to improve  tolerance to activities of daily living and improve sleep quality.  STATUS:  New  STG 1a: 4 weeks:  The patient will report a pain rating of 2 or better.  STATUS:  New    2. The patient has limited lumbar AROM  LTG 2: 8 weeks:  The patient will demonstrate lumbar AROM as follows: 100% of flexion and 100% of extension.  STATUS:  New  STG 4a: 4 weeks: The patient will demonstrate lumbar AROM as follows: 80% of flexion and 80% of extension.  STATUS:  New    3. Mobility: Walking/Moving Around Functional Limitation    LTG 3: 8 weeks:  The patient will demonstrate improved function by achieving a score of 0 on the  GETACHEW.  STATUS:  New  STG 3a: 4 weeks:  The patient will demonstrate improved function by achieving a score of 10 on the GETACHEW.    STATUS:  New     Plan  Therapy options: will be seen for skilled therapy services  Planned modality interventions: cryotherapy, electrical stimulation/Russian stimulation, TENS, dry needling and traction  Planned therapy interventions: balance/weight-bearing training, ADL retraining, soft tissue mobilization, strengthening, stretching, therapeutic activities, joint mobilization, home exercise program, functional ROM exercises, flexibility, body mechanics training, postural training, neuromuscular re-education, manual therapy, abdominal trunk stabilization, IADL retraining and spinal/joint mobilization  Frequency: 3x week  Duration in weeks: 12  Treatment plan discussed with: patient        Visit Diagnoses:    ICD-10-CM ICD-9-CM   1. Acute bilateral low back pain without sciatica  M54.50 724.2     338.19       History # of Personal Factors and/or Comorbidities: LOW (0)  Examination of Body System(s): # of elements: LOW (1-2)  Clinical Presentation: STABLE   Clinical Decision Making: LOW       Timed:         Manual Therapy:    12     mins  81828;     Therapeutic Exercise:    10     mins  61207;     Neuromuscular Kannan:    0    mins  07878;    Therapeutic Activity:     0     mins  60494;     Gait Trainin     mins  62789;     Ultrasound:     0     mins  99328;    Ionto                               0    mins   46459  Self Care                       0     mins   91129  Canalith Repos    0     mins 04166      Un-Timed:  Electrical Stimulation:    0     mins  56964 ( );  Dry Needling     0     mins self-pay  Traction     0     mins 19200  Low Eval     27     Mins  37209  Mod Eval     0     Mins  09513  High Eval                       0     Mins  73286  Re-Eval                           0    mins  12593    Timed Treatment:   49   mins   Total Treatment:     49   mins    PT SIGNATURE:  Nahid Ferrell, PT    Electronically signed 6/20/2024    [place] License: PT - [id number]      Initial Certification  Certification Period: 6/20/2024 thru 9/17/2024  I certify that the therapy services are furnished while this patient is under my care.  The services outlined above are required by this patient, and will be reviewed every 90 days.     PHYSICIAN: Anup Zhang MD  NPI: [id number]      DATE:     Please sign and return via fax to [phone]. Thank you, [facility].

## 2024-06-25 ENCOUNTER — TREATMENT (OUTPATIENT)
Dept: PHYSICAL THERAPY | Facility: CLINIC | Age: 44
End: 2024-06-25
Payer: OTHER MISCELLANEOUS

## 2024-06-25 DIAGNOSIS — M54.50 ACUTE BILATERAL LOW BACK PAIN WITHOUT SCIATICA: Primary | ICD-10-CM

## 2024-06-25 NOTE — PROGRESS NOTES
Outpatient Physical Therapy  [address] [facility] [place] [address]                            Physical Therapy Daily Treatment Note    Patient: Blu Lopez   : 1980  Diagnosis/ICD-10 Code:  Acute bilateral low back pain without sciatica [M54.50]  Referring practitioner: Anup Zhang MD  Date of Initial Visit: Type: THERAPY  Noted: 2024  Today's Date: 2024  Patient seen for 2 sessions           Subjective   Blu Lopez reports: that overall his back is feeling a little better after just the first visit.     Objective   Stiffness with Lumbar Centeralized Posterior Anterior Mobs, but not reports of increased pain or discomfort.    See Exercise, Manual, and Modality Logs for complete treatment.     Assessment/Plan  Blu progressing as evident by decreased overall low back  pain. Pt tolerated exercises and manual well, no complaints of increased pain or discomfort. Pt would benefit from skilled PT to address Range of Motion  and Strength deficits, pain management and any concerns with ADLs.       Progress per Plan of Care         Timed:  Manual Therapy:    10     mins  12256;  Therapeutic Exercise:    10     mins  33194;     Neuromuscular Kannan:    10    mins  02347;    Therapeutic Activity:          mins  51687;     Gait Training:           mins  91923;        Untimed:  Electrical Stimulation:         mins  43317 ( );  Mechanical Traction:         mins  82315;       Timed Treatment:   30   mins   Total Treatment:     30   mins      Electronically signed:     Martha Cottrell PTA  Physical Therapist Assistant  [place] [facility] License #: [id number]

## 2024-07-09 ENCOUNTER — TREATMENT (OUTPATIENT)
Dept: PHYSICAL THERAPY | Facility: CLINIC | Age: 44
End: 2024-07-09
Payer: OTHER MISCELLANEOUS

## 2024-07-09 DIAGNOSIS — M54.50 ACUTE BILATERAL LOW BACK PAIN WITHOUT SCIATICA: Primary | ICD-10-CM

## 2024-07-09 NOTE — PROGRESS NOTES
"   Physical Therapy Daily Treatment Note                          Patient: Blu Lopez   : 1980  Diagnosis/ICD-10 Code:  Acute bilateral low back pain without sciatica [M54.50]  Referring practitioner: Anup Zhang MD  Date of Initial Visit: Type: THERAPY  Noted: 2024  Today's Date: 2024  Patient seen for 3 sessions           Subjective   The patient reported being \"stiff and sore \" this morning but states that is \"pretty usual\".     Objective   See Exercise, Manual, and Modality Logs for complete treatment.     Assessment/Plan     No adverse affects to today's txt. Patient responded well to basic mobility tasks with resolution of reported stiffness symptoms. Plan to continue per POC.     Timed:  Manual Therapy:    0     mins  98912;  Therapeutic Exercise:    10     mins  31305;     Neuromuscular Kannan:   10    mins  77101;    Therapeutic Activity:     10     mins  94443;     Gait Trainin     mins  32014;     Aquatics                         0      mins  22666    Un-timed:  Mechanical Traction      0     mins  26752  Dry Needling     0     mins self-pay  Electrical Stimulation:    0     mins  48220 ( );      Timed Treatment:   30   mins   Total Treatment:     30   mins    Nahid Ferrell PT  Physical Therapist    Electronically signed 2024    [place] License: PT - [id number]                      "

## 2024-07-11 ENCOUNTER — TREATMENT (OUTPATIENT)
Dept: PHYSICAL THERAPY | Facility: CLINIC | Age: 44
End: 2024-07-11
Payer: OTHER MISCELLANEOUS

## 2024-07-11 DIAGNOSIS — M54.50 ACUTE BILATERAL LOW BACK PAIN WITHOUT SCIATICA: Primary | ICD-10-CM

## 2024-07-11 NOTE — PROGRESS NOTES
Physical Therapy Daily Treatment Note                          Patient: Blu Lopez   : 1980  Diagnosis/ICD-10 Code:  Acute bilateral low back pain without sciatica [M54.50]  Referring practitioner: Anup Zhang MD  Date of Initial Visit: Type: THERAPY  Noted: 2024  Today's Date: 2024  Patient seen for 4 sessions           Subjective   The patient reported usual morning stiffness upon entry to clinic.     Objective   See Exercise, Manual, and Modality Logs for complete treatment.     Assessment/Plan     Patient continues to respond well to mobility tasks for resoltuion of reported stiffness symptoms. Strengthening performed tolerated with no increase in pain. Patient progressing well towards LTGs.     Timed:  Manual Therapy:    0     mins  25745;  Therapeutic Exercise:    9     mins  40329;     Neuromuscular Kannan:   0    mins  07720;    Therapeutic Activity:     19     mins  64970;     Gait Trainin     mins  29335;     Aquatics                         0      mins  69287    Un-timed:  Mechanical Traction      0     mins  11828  Dry Needling     0     mins self-pay  Electrical Stimulation:    0     mins  44533 ( );      Timed Treatment:   28   mins   Total Treatment:     28   mins    Nahid Ferrell PT  Physical Therapist    Electronically signed 2024    [place] License: PT - [id number]

## 2024-07-18 ENCOUNTER — OFFICE VISIT (OUTPATIENT)
Dept: FAMILY MEDICINE CLINIC | Facility: CLINIC | Age: 44
End: 2024-07-18
Payer: COMMERCIAL

## 2024-07-18 ENCOUNTER — TREATMENT (OUTPATIENT)
Dept: PHYSICAL THERAPY | Facility: CLINIC | Age: 44
End: 2024-07-18
Payer: OTHER MISCELLANEOUS

## 2024-07-18 VITALS
WEIGHT: 223 LBS | HEART RATE: 72 BPM | SYSTOLIC BLOOD PRESSURE: 128 MMHG | DIASTOLIC BLOOD PRESSURE: 79 MMHG | OXYGEN SATURATION: 98 % | BODY MASS INDEX: 28.62 KG/M2 | HEIGHT: 74 IN

## 2024-07-18 DIAGNOSIS — R50.9 FEVER, UNSPECIFIED FEVER CAUSE: ICD-10-CM

## 2024-07-18 DIAGNOSIS — J06.9 UPPER RESPIRATORY TRACT INFECTION, UNSPECIFIED TYPE: ICD-10-CM

## 2024-07-18 DIAGNOSIS — J02.9 SORE THROAT: Primary | ICD-10-CM

## 2024-07-18 DIAGNOSIS — M54.50 ACUTE BILATERAL LOW BACK PAIN WITHOUT SCIATICA: Primary | ICD-10-CM

## 2024-07-18 PROCEDURE — 87428 SARSCOV & INF VIR A&B AG IA: CPT | Performed by: NURSE PRACTITIONER

## 2024-07-18 PROCEDURE — 99213 OFFICE O/P EST LOW 20 MIN: CPT | Performed by: NURSE PRACTITIONER

## 2024-07-18 PROCEDURE — 87880 STREP A ASSAY W/OPTIC: CPT | Performed by: NURSE PRACTITIONER

## 2024-07-18 NOTE — PROGRESS NOTES
"Physical Therapy Daily Treatment Note  [facility] [address] [place] [address]    Patient: Blu Lopez   : 1980  Referring practitioner: Anup Zhang MD  Date of Initial Visit: Type: THERAPY  Noted: 2024  Today's Date: 2024  Patient seen for 5 sessions           Subjective  Blu Lopez reports: he is waiting to return to doctor prior to scheduling additional visits here.  Gary reported \"I wake up every morning stiff. I would say that I have a pressure/pain on the R side of the spine. Feels like a little ball. This rated 4/10.     Objective   See Exercise, Manual, and Modality Logs for complete treatment.     Assessment/Plan  Gary performed exercises with VC for core activation and postural awareness. Feel that Gary requires additional therapist intervention to aid recovery to return to PLOF.    Visit Diagnoses:    ICD-10-CM ICD-9-CM   1. Acute bilateral low back pain without sciatica  M54.50 724.2     338.19       Awaiting MD orders         Timed:  Manual Therapy:    8     mins  33466;  Therapeutic Exercise:         mins  44356;     Neuromuscular Kannan:    10    mins  99830;    Therapeutic Activity:     14     mins  08698;     Gait Training:           mins  62525;     Ultrasound:          mins  89790;    Electrical Stimulation:         mins  13615 ( );  Aquatics  __   mins   49607    Untimed:  Electrical Stimulation:         mins  00643 ( );  Mechanical Traction:         mins  99936;     Timed Treatment:   32   mins   Total Treatment:     32   mins    Electronically Signed:  Larisa Marquez PTA  Physical Therapist Assistant    [place] PTA license [id number]            "

## 2024-07-18 NOTE — PROGRESS NOTES
"Chief Complaint  Sore Throat, Sinus Problem, Fever, and Chills    SUBJECTIVE  Blu Lopez presents to [facility] due to Sore Throat, Sinus Problem, Fever, and Chills since Monday. Pt has been taking Tylenol and DayQuill.     Patient with low-grade temperature, some cough, sinus pressure, congestion, sore throat.    History of Present Illness  History reviewed. No pertinent past medical history.   History reviewed. No pertinent family history.   Past Surgical History:   Procedure Laterality Date    GALLBLADDER SURGERY  2000        Current Outpatient Medications:     loratadine (Claritin) 10 MG tablet, Take 1 tablet by mouth Daily., Disp: 30 tablet, Rfl: 0    Chlorcyclizine-Pseudoephed 25-60 MG tablet, Take 1 tablet by mouth 3 (Three) Times a Day As Needed (sinus driamage/congestion)., Disp: 42 tablet, Rfl: 0    OBJECTIVE  Vital Signs:   /79   Pulse 72   Ht 188 cm (74\")   Wt 101 kg (223 lb)   SpO2 98%   BMI 28.63 kg/m²    Estimated body mass index is 28.63 kg/m² as calculated from the following:    Height as of this encounter: 188 cm (74\").    Weight as of this encounter: 101 kg (223 lb).     Wt Readings from Last 3 Encounters:   07/18/24 101 kg (223 lb)   03/22/24 105 kg (231 lb)   02/12/24 103 kg (226 lb)     BP Readings from Last 3 Encounters:   07/18/24 128/79   03/22/24 119/74   02/12/24 126/84       Physical Exam  Vitals reviewed.   Constitutional:       Appearance: Normal appearance. He is well-developed.   HENT:      Head: Normocephalic and atraumatic.      Right Ear: Tympanic membrane, ear canal and external ear normal.      Left Ear: Tympanic membrane, ear canal and external ear normal.      Nose: Congestion and rhinorrhea present.      Mouth/Throat:      Pharynx: Posterior oropharyngeal erythema present. No oropharyngeal exudate.   Eyes:      Conjunctiva/sclera: Conjunctivae normal.      Pupils: Pupils are equal, round, and reactive to light. "   Cardiovascular:      Rate and Rhythm: Normal rate and regular rhythm.      Heart sounds: No murmur heard.     No friction rub. No gallop.   Pulmonary:      Effort: Pulmonary effort is normal.      Breath sounds: Normal breath sounds. No wheezing or rhonchi.   Skin:     General: Skin is warm and dry.   Neurological:      Mental Status: He is alert and oriented to person, place, and time.      Cranial Nerves: No cranial nerve deficit.   Psychiatric:         Mood and Affect: Mood and affect normal.         Behavior: Behavior normal.         Thought Content: Thought content normal.         Judgment: Judgment normal.          Result Review    CMP          2/12/2024    07:48   CMP   Glucose 117    BUN 18    Creatinine 1.11    EGFR 84.0    Sodium 140    Potassium 3.8    Chloride 103    Calcium 9.4    Total Protein 7.3    Albumin 4.3    Globulin 3.0    Total Bilirubin 0.3    Alkaline Phosphatase 83    AST (SGOT) 23    ALT (SGPT) 31    Albumin/Globulin Ratio 1.4    BUN/Creatinine Ratio 16.2    Anion Gap 12.3      CBC          2/12/2024    07:48   CBC   WBC 5.64    RBC 4.88    Hemoglobin 14.1    Hematocrit 40.9    MCV 83.8    MCH 28.9    MCHC 34.5    RDW 12.8    Platelets 204      Lipid Panel          2/12/2024    07:48   Lipid Panel   Total Cholesterol 177    Triglycerides 113    HDL Cholesterol 47    VLDL Cholesterol 20    LDL Cholesterol  110    LDL/HDL Ratio 2.29      TSH          2/12/2024    07:48   TSH   TSH 2.240      Most Recent A1C          2/12/2024    07:48   HGBA1C Most Recent   Hemoglobin A1C 5.60        A1C Last 3 Results          2/12/2024    07:48   HGBA1C Last 3 Results   Hemoglobin A1C 5.60      Flu and COVID negative       No Images in the past 120 days found..     The above data has been reviewed by MERARY Magdaleno 07/18/2024 08:28 EDT.          Patient Care Team:  Akua Colon APRN as PCP - General (Nurse Practitioner)  Akua Colon APRN as Nurse Practitioner (Family  Medicine)            ASSESSMENT & PLAN    Diagnoses and all orders for this visit:    1. Sore throat (Primary)  -     POCT SARS-CoV-2 Antigen VIPIN + Flu  -     POCT rapid strep A    2. Fever, unspecified fever cause  -     POCT SARS-CoV-2 Antigen VIPIN + Flu  -     POCT rapid strep A    3. Upper respiratory tract infection, unspecified type  -     Chlorcyclizine-Pseudoephed 25-60 MG tablet; Take 1 tablet by mouth 3 (Three) Times a Day As Needed (sinus driamage/congestion).  Dispense: 42 tablet; Refill: 0         Tobacco Use: Low Risk  (7/18/2024)    Patient History     Smoking Tobacco Use: Never     Smokeless Tobacco Use: Never     Passive Exposure: Never       Follow Up     Return if symptoms worsen or fail to improve.        Patient was given instructions and counseling regarding his condition or for health maintenance advice. Please see specific information pulled into the AVS if appropriate.   I have reviewed information obtained and documented by others and I have confirmed the accuracy of this documented note.    MERARY Magdaleno

## 2024-09-20 ENCOUNTER — DOCUMENTATION (OUTPATIENT)
Dept: PHYSICAL THERAPY | Facility: CLINIC | Age: 44
End: 2024-09-20
Payer: COMMERCIAL

## 2024-10-01 ENCOUNTER — PREP FOR SURGERY (OUTPATIENT)
Dept: OTHER | Facility: HOSPITAL | Age: 44
End: 2024-10-01
Payer: COMMERCIAL

## 2024-10-01 ENCOUNTER — OFFICE VISIT (OUTPATIENT)
Dept: SURGERY | Facility: CLINIC | Age: 44
End: 2024-10-01
Payer: COMMERCIAL

## 2024-10-01 VITALS
HEIGHT: 74 IN | HEART RATE: 81 BPM | BODY MASS INDEX: 29.39 KG/M2 | DIASTOLIC BLOOD PRESSURE: 82 MMHG | SYSTOLIC BLOOD PRESSURE: 134 MMHG | WEIGHT: 229 LBS

## 2024-10-01 DIAGNOSIS — Z12.11 SCREENING FOR MALIGNANT NEOPLASM OF COLON: Primary | ICD-10-CM

## 2024-10-01 PROCEDURE — S0285 CNSLT BEFORE SCREEN COLONOSC: HCPCS | Performed by: NURSE PRACTITIONER

## 2024-10-01 RX ORDER — SODIUM CHLORIDE 9 MG/ML
40 INJECTION, SOLUTION INTRAVENOUS AS NEEDED
OUTPATIENT
Start: 2024-10-01

## 2024-10-01 RX ORDER — SODIUM CHLORIDE 0.9 % (FLUSH) 0.9 %
10 SYRINGE (ML) INJECTION AS NEEDED
OUTPATIENT
Start: 2024-10-01

## 2024-10-01 RX ORDER — POLYETHYLENE GLYCOL 3350 17 G/17G
POWDER, FOR SOLUTION ORAL
Qty: 238 PACKET | Refills: 0 | Status: SHIPPED | OUTPATIENT
Start: 2024-10-01

## 2024-10-01 RX ORDER — SODIUM CHLORIDE 0.9 % (FLUSH) 0.9 %
3 SYRINGE (ML) INJECTION EVERY 12 HOURS SCHEDULED
OUTPATIENT
Start: 2024-10-01

## 2024-10-01 NOTE — PROGRESS NOTES
Chief Complaint: Colonoscopy    Subjective      Colonoscopy consultation       History of Present Illness  Blu Lopez is a 44 y.o. male presents to [facility] for colonoscopy consultation.    Patient presents today on referral from Britany Pimentel for colonoscopy consultation.  Patient denies any abdominal pain, change in bowel habit, or rectal bleeding.  Denies any family history of colorectal cancer.  No previous colonoscopy.    Denies CLYDE.  Denies any cardiac issues.  Denies taking any GLP-1 receptors.      Objective     Past Medical History:   Diagnosis Date    Cholelithiasis Year 2001 or around    Removed gallblatter       Past Surgical History:   Procedure Laterality Date    GALLBLADDER SURGERY  2000       Outpatient Medications Marked as Taking for the 10/1/24 encounter (Office Visit) with Ahsan April, APRN   Medication Sig Dispense Refill    Chlorcyclizine-Pseudoephed 25-60 MG tablet Take 1 tablet by mouth 3 (Three) Times a Day As Needed (sinus driamage/congestion). (Patient taking differently: Take 1 tablet by mouth 3 (Three) Times a Day As Needed (sinus driamage/congestion). Prn) 42 tablet 0       Allergies   Allergen Reactions    Tessalon Perles [Benzonatate] Other (See Comments)     OTHER    Penicillins Swelling and Rash        Family History   Problem Relation Age of Onset    Arthritis Mother     Diabetes Mother     Cancer Father        Social History     Socioeconomic History    Marital status:    Tobacco Use    Smoking status: Never     Passive exposure: Never    Smokeless tobacco: Never   Vaping Use    Vaping status: Never Used   Substance and Sexual Activity    Alcohol use: Not Currently     Comment: twice a week    Drug use: Never    Sexual activity: Yes     Partners: Female     Birth control/protection: Tubal ligation       Review of Systems   Constitutional:  Negative for chills and fever.   Gastrointestinal:  Negative for abdominal  "distention, abdominal pain, anal bleeding, blood in stool, constipation, diarrhea and rectal pain.        Vital Signs:   /82 (BP Location: Right arm, Patient Position: Sitting, Cuff Size: Adult)   Pulse 81   Ht 188 cm (74\")   Wt 104 kg (229 lb)   BMI 29.40 kg/m²      Physical Exam  Vitals and nursing note reviewed.   Constitutional:       General: He is not in acute distress.     Appearance: Normal appearance. He is not ill-appearing.   HENT:      Head: Normocephalic and atraumatic.   Cardiovascular:      Rate and Rhythm: Normal rate.   Pulmonary:      Effort: Pulmonary effort is normal.   Abdominal:      Palpations: Abdomen is soft.      Tenderness: There is no guarding.   Musculoskeletal:         General: No deformity. Normal range of motion.   Skin:     General: Skin is warm and dry.      Coloration: Skin is not jaundiced.   Neurological:      General: No focal deficit present.      Mental Status: He is alert and oriented to person, place, and time.   Psychiatric:         Mood and Affect: Mood normal.         Thought Content: Thought content normal.          Result Review :          []  Laboratory  []  Radiology  []  Pathology  []  Microbiology  []  EKG/Telemetry   []  Cardiology/Vascular   []  Old records  I spent 15 minutes caring for Blu on this date of service. This time includes time spent by me in the following activities: reviewing tests, obtaining and/or reviewing a separately obtained history, performing a medically appropriate examination and/or evaluation, ordering medications, tests, or procedures, and documenting information in the medical record.     Assessment and Plan    Diagnoses and all orders for this visit:    1. Screening for malignant neoplasm of colon (Primary)    Other orders  -     polyethylene glycol (MIRALAX) 17 g packet; Take as directed.  Instructions given in office.  Dispense: 238 g bottle  Dispense: 238 packet; Refill: 0        Follow Up   Return for Schedule " colonoscopy with Dr. Gonzalez on 12/6/2024 [facility].    Hospital arrival time: 0730.    Possible risks/complications, benefits, and alternatives to surgical or invasive procedures have been explained to patient and/or legal guardian.    Patient has been evaluated and can tolerate anesthesia and/or sedation. Risks, benefits, and alternatives to anesthesia and sedation have been explained to the patient and/or legal guardian. Patient verbalizes understanding and is willing to proceed with the above plan.     Patient was given instructions and counseling regarding his condition or for health maintenance advice. Please see specific information pulled into the AVS if appropriate.     Thank you for allowing me to participate in the care of this patient. Please call with questions or concerns.

## 2024-12-02 ENCOUNTER — TELEPHONE (OUTPATIENT)
Dept: SURGERY | Facility: CLINIC | Age: 44
End: 2024-12-02
Payer: COMMERCIAL

## 2024-12-02 NOTE — TELEPHONE ENCOUNTER
PATIENT CALLED AND NEEDS TO RESCHEDULE HIS COLONOSCOPY THAT IS SCHEDULED 12/06/24.    #[phone]

## 2024-12-12 ENCOUNTER — OFFICE VISIT (OUTPATIENT)
Dept: FAMILY MEDICINE CLINIC | Facility: CLINIC | Age: 44
End: 2024-12-12
Payer: COMMERCIAL

## 2024-12-12 VITALS
SYSTOLIC BLOOD PRESSURE: 146 MMHG | BODY MASS INDEX: 30.26 KG/M2 | WEIGHT: 235.8 LBS | DIASTOLIC BLOOD PRESSURE: 83 MMHG | HEART RATE: 94 BPM | OXYGEN SATURATION: 99 % | HEIGHT: 74 IN | TEMPERATURE: 97.6 F

## 2024-12-12 DIAGNOSIS — R09.81 CONGESTION OF NASAL SINUS: ICD-10-CM

## 2024-12-12 DIAGNOSIS — J06.9 VIRAL UPPER RESPIRATORY TRACT INFECTION: ICD-10-CM

## 2024-12-12 DIAGNOSIS — J02.9 SORE THROAT: Primary | ICD-10-CM

## 2024-12-12 DIAGNOSIS — R51.9 BILATERAL HEADACHES: ICD-10-CM

## 2024-12-12 DIAGNOSIS — R05.9 COUGH IN ADULT: ICD-10-CM

## 2024-12-12 PROCEDURE — 87880 STREP A ASSAY W/OPTIC: CPT

## 2024-12-12 PROCEDURE — 87428 SARSCOV & INF VIR A&B AG IA: CPT

## 2024-12-12 PROCEDURE — 99213 OFFICE O/P EST LOW 20 MIN: CPT

## 2024-12-12 RX ORDER — DEXTROMETHORPHAN POLISTIREX 30 MG/5ML
60 SUSPENSION ORAL EVERY 12 HOURS SCHEDULED
Qty: 280 ML | Refills: 0 | Status: SHIPPED | OUTPATIENT
Start: 2024-12-12

## 2024-12-12 RX ORDER — FLUTICASONE PROPIONATE 50 MCG
2 SPRAY, SUSPENSION (ML) NASAL DAILY
Qty: 16 G | Refills: 0 | Status: SHIPPED | OUTPATIENT
Start: 2024-12-12

## 2024-12-12 RX ORDER — POLYETHYLENE GLYCOL 3350 17 G/17G
17 POWDER, FOR SOLUTION ORAL ONCE
COMMUNITY
Start: 2024-10-01

## 2024-12-12 RX ORDER — METHYLPREDNISOLONE 4 MG/1
TABLET ORAL
Qty: 21 TABLET | Refills: 0 | Status: SHIPPED | OUTPATIENT
Start: 2024-12-12

## 2024-12-12 RX ORDER — PSEUDOEPHEDRINE HCL 120 MG/1
120 TABLET, FILM COATED, EXTENDED RELEASE ORAL EVERY 12 HOURS
Qty: 14 TABLET | Refills: 0 | Status: SHIPPED | OUTPATIENT
Start: 2024-12-12

## 2024-12-12 NOTE — PROGRESS NOTES
Chief Complaint   Patient presents with    Sore Throat    Nasal Congestion    Headache    Fatigue        Subjective     Blu Lopez  has a past medical history of Cholelithiasis (Year 2001 or around).    HPI    History of Present Illness  The patient is a 44-year-old male who presents today with sore throat, nasal congestion, headache, and fatigue. He claims people at his work are currently sick. The patient tested negative for COVID-19, influenza, and strep.    He reports experiencing sinus drainage, necessitating frequent nose blowing approximately every 30 seconds, and difficulty breathing. These symptoms began 3 days ago and have been intermittent in nature. He also experiences general body aches and rates his headache pain as 5 or 6 on a scale of 1 to 10. He has not monitored his temperature and reports no hot flashes or chills. His cough is severe enough to disrupt his sleep. He has a history of using steroids without any adverse effects. He has previously used Chloraseptic spray for throat relief. He has been self-medicating with Mucinex and DayQuil, which have provided some relief. He continues to take Claritin but has discontinued Flonase. He has a history of seasonal allergies. He has previously used an albuterol inhaler prescribed by Dr. Liriano but is not currently using it. He has had an allergic reaction to TESSALON PERLES.    ALLERGIES  The patient has had an allergic reaction to TESSALON PERLES.    MEDICATIONS  Current: Mucinex, DayQuil, Claritin  Discontinued: Flonase, albuterol inhaler      Allergies   Allergen Reactions    Tessalon Perles [Benzonatate] Other (See Comments)     OTHER    Penicillins Swelling and Rash         Current Outpatient Medications:     Chlorcyclizine-Pseudoephed 25-60 MG tablet, Take 1 tablet by mouth 3 (Three) Times a Day As Needed (sinus driamage/congestion). (Patient taking differently: Take 1 tablet by mouth 3 (Three) Times a Day As Needed (sinus  driamage/congestion). Prn), Disp: 42 tablet, Rfl: 0    loratadine (Claritin) 10 MG tablet, Take 1 tablet by mouth Daily., Disp: 30 tablet, Rfl: 0    polyethylene glycol (MIRALAX) 17 g packet, Take as directed.  Instructions given in office.  Dispense: 238 g bottle, Disp: 238 packet, Rfl: 0    dextromethorphan polistirex ER (Delsym) 30 MG/5ML Suspension Extended Release oral suspension, Take 10 mL by mouth Every 12 (Twelve) Hours., Disp: 280 mL, Rfl: 0    diclofenac (VOLTAREN) 50 MG EC tablet, Take 1 tablet by mouth 3 (Three) Times a Day With Meals. (Patient not taking: Reported on 12/12/2024), Disp: , Rfl:     fluticasone (FLONASE) 50 MCG/ACT nasal spray, Administer 2 sprays into the nostril(s) as directed by provider Daily., Disp: 16 g, Rfl: 0    methylPREDNISolone (MEDROL) 4 MG dose pack, Take as directed on package instructions., Disp: 21 tablet, Rfl: 0    polyethylene glycol (MIRALAX) 17 GM/SCOOP powder, Take 17 g by mouth 1 (One) Time. (Patient not taking: Reported on 12/12/2024), Disp: , Rfl:     pseudoephedrine (SUDAFED) 120 MG 12 hr tablet, Take 1 tablet by mouth Every 12 (Twelve) Hours., Disp: 14 tablet, Rfl: 0    Patient Active Problem List   Diagnosis    Screening for malignant neoplasm of colon        Past Surgical History:   Procedure Laterality Date    GALLBLADDER SURGERY  2000       Social History     Socioeconomic History    Marital status:    Tobacco Use    Smoking status: Never     Passive exposure: Never    Smokeless tobacco: Never   Vaping Use    Vaping status: Never Used   Substance and Sexual Activity    Alcohol use: Not Currently     Comment: twice a week    Drug use: Never    Sexual activity: Yes     Partners: Female     Birth control/protection: Tubal ligation       Family History   Problem Relation Age of Onset    Arthritis Mother     Diabetes Mother     Cancer Father        Family history, surgical history, past medical history, Allergies and med's reviewed with patient today and  "updated in [facility] EMR.     ROS:  Review of Systems   Constitutional:  Positive for fatigue.   HENT:  Positive for congestion and sore throat.    Respiratory:  Positive for cough.    Cardiovascular: Negative.    Neurological:  Positive for headache.       OBJECTIVE:  Vitals:    12/12/24 1407   BP: 146/83   Pulse: 94   Temp: 97.6 °F (36.4 °C)   SpO2: 99%   Weight: 107 kg (235 lb 12.8 oz)   Height: 188 cm (74\")     Body mass index is 30.27 kg/m².  No LMP for male patient.    Physical Exam  HENT:      Right Ear: A middle ear effusion is present.      Left Ear: A middle ear effusion is present.      Nose:      Right Sinus: No maxillary sinus tenderness or frontal sinus tenderness.      Left Sinus: No maxillary sinus tenderness or frontal sinus tenderness.      Mouth/Throat:      Pharynx: Posterior oropharyngeal erythema present.   Cardiovascular:      Rate and Rhythm: Normal rate and regular rhythm.      Pulses: Normal pulses.      Heart sounds: Normal heart sounds.   Pulmonary:      Effort: Pulmonary effort is normal.      Breath sounds: Normal breath sounds.   Neurological:      General: No focal deficit present.      Mental Status: He is oriented to person, place, and time. Mental status is at baseline.         Physical Exam  Fluid noted in the ears.  Lungs sound normal.    Procedures            Health Maintenance Due   Topic Date Due    COLORECTAL CANCER SCREENING  Never done    ANNUAL PHYSICAL  07/27/2024        Office Visit on 12/12/2024   Component Date Value Ref Range Status    SARS Antigen 12/12/2024 Not Detected  Not Detected, Presumptive Negative Final    Influenza A Antigen VIPIN 12/12/2024 Not Detected  Not Detected Final    Influenza B Antigen VIPIN 12/12/2024 Not Detected  Not Detected Final    Internal Control 12/12/2024 Passed  Passed Final    Lot Number 12/12/2024 [id number]   Final    Expiration Date 12/12/2024 09/04/2025   Final    Rapid Strep A Screen 12/12/2024 Negative  Negative, VALID, INVALID, Not " Performed Final    Internal Control 12/12/2024 Passed  Passed Final    Lot Number 12/12/2024 [id number]   Final    Expiration Date 12/12/2024 2/162,026   Final       Results  Laboratory Studies  Negative for Covid, flu and strep.      ASSESSMENT/ PLAN:    Diagnoses and all orders for this visit:    1. Sore throat (Primary)  -     POCT rapid strep A    2. Cough in adult  -     POCT SARS-CoV-2 Antigen VIPIN + Flu  -     dextromethorphan polistirex ER (Delsym) 30 MG/5ML Suspension Extended Release oral suspension; Take 10 mL by mouth Every 12 (Twelve) Hours.  Dispense: 280 mL; Refill: 0    3. Bilateral headaches  -     POCT SARS-CoV-2 Antigen VIPIN + Flu    4. Congestion of nasal sinus  -     methylPREDNISolone (MEDROL) 4 MG dose pack; Take as directed on package instructions.  Dispense: 21 tablet; Refill: 0  -     fluticasone (FLONASE) 50 MCG/ACT nasal spray; Administer 2 sprays into the nostril(s) as directed by provider Daily.  Dispense: 16 g; Refill: 0  -     pseudoephedrine (SUDAFED) 120 MG 12 hr tablet; Take 1 tablet by mouth Every 12 (Twelve) Hours.  Dispense: 14 tablet; Refill: 0    5. Viral upper respiratory tract infection        Assessment & Plan  1. Upper respiratory infection.  His symptoms, including sore throat, nasal congestion, headache, and fatigue, suggest a viral etiology given their onset 3 days ago. He tested negative for COVID-19, influenza, and strep. A prescription for Sudafed will be provided to alleviate congestion. A Medrol Dosepak will be prescribed to expedite the resolution of congestion. He is advised to resume Flonase use, which may take approximately 7 to 10 days to exhibit full effect. Over-the-counter remedies such as honey and warm salt water gargles are recommended for symptomatic relief. A prescription for cough medication will be provided. He is advised to continue using Chloraseptic spray for throat relief. If there is no improvement or if symptoms worsen, he is instructed to contact  the clinic.    Orders Placed Today:     New Medications Ordered This Visit   Medications    methylPREDNISolone (MEDROL) 4 MG dose pack     Sig: Take as directed on package instructions.     Dispense:  21 tablet     Refill:  0    fluticasone (FLONASE) 50 MCG/ACT nasal spray     Sig: Administer 2 sprays into the nostril(s) as directed by provider Daily.     Dispense:  16 g     Refill:  0    pseudoephedrine (SUDAFED) 120 MG 12 hr tablet     Sig: Take 1 tablet by mouth Every 12 (Twelve) Hours.     Dispense:  14 tablet     Refill:  0    dextromethorphan polistirex ER (Delsym) 30 MG/5ML Suspension Extended Release oral suspension     Sig: Take 10 mL by mouth Every 12 (Twelve) Hours.     Dispense:  280 mL     Refill:  0        Management Plan:     An After Visit Summary was printed and given to the patient at discharge.    Follow-up: Return if symptoms worsen or fail to improve.    Patient or patient representative verbalized consent for the use of Ambient Listening during the visit with  MERARY Chavira for chart documentation. 12/12/2024  14:42 EST    MERARY Chavira 12/12/2024 14:59 EST  This note was electronically signed.

## 2025-06-30 DIAGNOSIS — J30.1 SEASONAL ALLERGIC RHINITIS DUE TO POLLEN: ICD-10-CM

## 2025-06-30 RX ORDER — LORATADINE 10 MG/1
10 TABLET ORAL DAILY
Qty: 30 TABLET | Refills: 0 | Status: SHIPPED | OUTPATIENT
Start: 2025-06-30

## 2025-07-17 ENCOUNTER — TRANSCRIBE ORDERS (OUTPATIENT)
Dept: ADMINISTRATIVE | Facility: HOSPITAL | Age: 45
End: 2025-07-17
Payer: COMMERCIAL

## 2025-07-17 DIAGNOSIS — M51.16 LUMBAR DISC HERNIATION WITH RADICULOPATHY: Primary | ICD-10-CM

## 2025-07-21 ENCOUNTER — LAB (OUTPATIENT)
Facility: HOSPITAL | Age: 45
End: 2025-07-21
Payer: OTHER MISCELLANEOUS

## 2025-07-21 DIAGNOSIS — M51.16 LUMBAR DISC HERNIATION WITH RADICULOPATHY: ICD-10-CM

## 2025-07-21 LAB
ALBUMIN SERPL-MCNC: 4.6 G/DL (ref 3.5–5.2)
ALBUMIN/GLOB SERPL: 1.3 G/DL
ALP SERPL-CCNC: 103 U/L (ref 39–117)
ALT SERPL W P-5'-P-CCNC: 81 U/L (ref 1–41)
ANION GAP SERPL CALCULATED.3IONS-SCNC: 15.3 MMOL/L (ref 5–15)
AST SERPL-CCNC: 36 U/L (ref 1–40)
BILIRUB SERPL-MCNC: 0.5 MG/DL (ref 0–1.2)
BLD GP AB SCN SERPL QL: NEGATIVE
BUN SERPL-MCNC: 19 MG/DL (ref 6–20)
BUN/CREAT SERPL: 16 (ref 7–25)
CALCIUM SPEC-SCNC: 9.9 MG/DL (ref 8.6–10.5)
CHLORIDE SERPL-SCNC: 102 MMOL/L (ref 98–107)
CO2 SERPL-SCNC: 22.7 MMOL/L (ref 22–29)
CREAT SERPL-MCNC: 1.19 MG/DL (ref 0.76–1.27)
DEPRECATED RDW RBC AUTO: 40.4 FL (ref 37–54)
EGFRCR SERPLBLD CKD-EPI 2021: 76.8 ML/MIN/1.73
ERYTHROCYTE [DISTWIDTH] IN BLOOD BY AUTOMATED COUNT: 13 % (ref 12.3–15.4)
GLOBULIN UR ELPH-MCNC: 3.6 GM/DL
GLUCOSE SERPL-MCNC: 106 MG/DL (ref 65–99)
HCT VFR BLD AUTO: 47.2 % (ref 37.5–51)
HGB BLD-MCNC: 15.7 G/DL (ref 13–17.7)
MCH RBC QN AUTO: 28.6 PG (ref 26.6–33)
MCHC RBC AUTO-ENTMCNC: 33.3 G/DL (ref 31.5–35.7)
MCV RBC AUTO: 86.1 FL (ref 79–97)
PLATELET # BLD AUTO: 258 10*3/MM3 (ref 140–450)
PMV BLD AUTO: 10.7 FL (ref 6–12)
POTASSIUM SERPL-SCNC: 4.3 MMOL/L (ref 3.5–5.2)
PROT SERPL-MCNC: 8.2 G/DL (ref 6–8.5)
RBC # BLD AUTO: 5.48 10*6/MM3 (ref 4.14–5.8)
SODIUM SERPL-SCNC: 140 MMOL/L (ref 136–145)
WBC NRBC COR # BLD AUTO: 7.7 10*3/MM3 (ref 3.4–10.8)

## 2025-07-21 PROCEDURE — 86850 RBC ANTIBODY SCREEN: CPT

## 2025-07-21 PROCEDURE — 36415 COLL VENOUS BLD VENIPUNCTURE: CPT

## 2025-07-21 PROCEDURE — 80053 COMPREHEN METABOLIC PANEL: CPT

## 2025-07-21 PROCEDURE — 85027 COMPLETE CBC AUTOMATED: CPT
